# Patient Record
Sex: FEMALE | Race: OTHER | HISPANIC OR LATINO | URBAN - METROPOLITAN AREA
[De-identification: names, ages, dates, MRNs, and addresses within clinical notes are randomized per-mention and may not be internally consistent; named-entity substitution may affect disease eponyms.]

---

## 2020-06-24 ENCOUNTER — INPATIENT (INPATIENT)
Facility: HOSPITAL | Age: 30
LOS: 12 days | Discharge: HOME | End: 2020-07-07
Attending: PSYCHIATRY & NEUROLOGY | Admitting: PSYCHIATRY & NEUROLOGY
Payer: COMMERCIAL

## 2020-06-24 VITALS
TEMPERATURE: 98 F | OXYGEN SATURATION: 100 % | DIASTOLIC BLOOD PRESSURE: 76 MMHG | RESPIRATION RATE: 18 BRPM | HEART RATE: 80 BPM | SYSTOLIC BLOOD PRESSURE: 136 MMHG

## 2020-06-24 DIAGNOSIS — F29 UNSPECIFIED PSYCHOSIS NOT DUE TO A SUBSTANCE OR KNOWN PHYSIOLOGICAL CONDITION: ICD-10-CM

## 2020-06-24 LAB
ALBUMIN SERPL ELPH-MCNC: 4.5 G/DL — SIGNIFICANT CHANGE UP (ref 3.3–5)
ALP SERPL-CCNC: 53 U/L — SIGNIFICANT CHANGE UP (ref 40–120)
ALT FLD-CCNC: 12 U/L — SIGNIFICANT CHANGE UP (ref 4–33)
AMPHET UR-MCNC: NEGATIVE — SIGNIFICANT CHANGE UP
ANION GAP SERPL CALC-SCNC: 16 MMO/L — HIGH (ref 7–14)
APAP SERPL-MCNC: < 15 UG/ML — LOW (ref 15–25)
APPEARANCE UR: CLEAR — SIGNIFICANT CHANGE UP
AST SERPL-CCNC: 13 U/L — SIGNIFICANT CHANGE UP (ref 4–32)
BACTERIA # UR AUTO: HIGH
BARBITURATES UR SCN-MCNC: NEGATIVE — SIGNIFICANT CHANGE UP
BASOPHILS # BLD AUTO: 0.05 K/UL — SIGNIFICANT CHANGE UP (ref 0–0.2)
BASOPHILS NFR BLD AUTO: 0.6 % — SIGNIFICANT CHANGE UP (ref 0–2)
BENZODIAZ UR-MCNC: NEGATIVE — SIGNIFICANT CHANGE UP
BILIRUB SERPL-MCNC: 0.3 MG/DL — SIGNIFICANT CHANGE UP (ref 0.2–1.2)
BILIRUB UR-MCNC: NEGATIVE — SIGNIFICANT CHANGE UP
BLOOD UR QL VISUAL: NEGATIVE — SIGNIFICANT CHANGE UP
BUN SERPL-MCNC: 6 MG/DL — LOW (ref 7–23)
CALCIUM SERPL-MCNC: 9.5 MG/DL — SIGNIFICANT CHANGE UP (ref 8.4–10.5)
CANNABINOIDS UR-MCNC: POSITIVE — SIGNIFICANT CHANGE UP
CHLORIDE SERPL-SCNC: 105 MMOL/L — SIGNIFICANT CHANGE UP (ref 98–107)
CO2 SERPL-SCNC: 19 MMOL/L — LOW (ref 22–31)
COCAINE METAB.OTHER UR-MCNC: NEGATIVE — SIGNIFICANT CHANGE UP
COLOR SPEC: YELLOW — SIGNIFICANT CHANGE UP
CREAT SERPL-MCNC: 0.55 MG/DL — SIGNIFICANT CHANGE UP (ref 0.5–1.3)
EOSINOPHIL # BLD AUTO: 0.06 K/UL — SIGNIFICANT CHANGE UP (ref 0–0.5)
EOSINOPHIL NFR BLD AUTO: 0.8 % — SIGNIFICANT CHANGE UP (ref 0–6)
ETHANOL BLD-MCNC: < 10 MG/DL — SIGNIFICANT CHANGE UP
GLUCOSE SERPL-MCNC: 88 MG/DL — SIGNIFICANT CHANGE UP (ref 70–99)
GLUCOSE UR-MCNC: NEGATIVE — SIGNIFICANT CHANGE UP
HCG SERPL-ACNC: < 5 MIU/ML — SIGNIFICANT CHANGE UP
HCG UR-SCNC: NEGATIVE — SIGNIFICANT CHANGE UP
HCT VFR BLD CALC: 39.3 % — SIGNIFICANT CHANGE UP (ref 34.5–45)
HGB BLD-MCNC: 13 G/DL — SIGNIFICANT CHANGE UP (ref 11.5–15.5)
HYALINE CASTS # UR AUTO: HIGH
IMM GRANULOCYTES NFR BLD AUTO: 0.3 % — SIGNIFICANT CHANGE UP (ref 0–1.5)
KETONES UR-MCNC: HIGH
LEUKOCYTE ESTERASE UR-ACNC: SIGNIFICANT CHANGE UP
LYMPHOCYTES # BLD AUTO: 2.29 K/UL — SIGNIFICANT CHANGE UP (ref 1–3.3)
LYMPHOCYTES # BLD AUTO: 29.2 % — SIGNIFICANT CHANGE UP (ref 13–44)
MCHC RBC-ENTMCNC: 28.6 PG — SIGNIFICANT CHANGE UP (ref 27–34)
MCHC RBC-ENTMCNC: 33.1 % — SIGNIFICANT CHANGE UP (ref 32–36)
MCV RBC AUTO: 86.4 FL — SIGNIFICANT CHANGE UP (ref 80–100)
METHADONE UR-MCNC: NEGATIVE — SIGNIFICANT CHANGE UP
MONOCYTES # BLD AUTO: 0.58 K/UL — SIGNIFICANT CHANGE UP (ref 0–0.9)
MONOCYTES NFR BLD AUTO: 7.4 % — SIGNIFICANT CHANGE UP (ref 2–14)
MUCOUS THREADS # UR AUTO: SIGNIFICANT CHANGE UP
NEUTROPHILS # BLD AUTO: 4.85 K/UL — SIGNIFICANT CHANGE UP (ref 1.8–7.4)
NEUTROPHILS NFR BLD AUTO: 61.7 % — SIGNIFICANT CHANGE UP (ref 43–77)
NITRITE UR-MCNC: NEGATIVE — SIGNIFICANT CHANGE UP
NRBC # FLD: 0 K/UL — SIGNIFICANT CHANGE UP (ref 0–0)
OPIATES UR-MCNC: NEGATIVE — SIGNIFICANT CHANGE UP
OXYCODONE UR-MCNC: NEGATIVE — SIGNIFICANT CHANGE UP
PCP UR-MCNC: NEGATIVE — SIGNIFICANT CHANGE UP
PH UR: 6.5 — SIGNIFICANT CHANGE UP (ref 5–8)
PLATELET # BLD AUTO: 318 K/UL — SIGNIFICANT CHANGE UP (ref 150–400)
PMV BLD: 10 FL — SIGNIFICANT CHANGE UP (ref 7–13)
POTASSIUM SERPL-MCNC: 3.4 MMOL/L — LOW (ref 3.5–5.3)
POTASSIUM SERPL-SCNC: 3.4 MMOL/L — LOW (ref 3.5–5.3)
PROT SERPL-MCNC: 7.4 G/DL — SIGNIFICANT CHANGE UP (ref 6–8.3)
PROT UR-MCNC: 50 — SIGNIFICANT CHANGE UP
RBC # BLD: 4.55 M/UL — SIGNIFICANT CHANGE UP (ref 3.8–5.2)
RBC # FLD: 13.2 % — SIGNIFICANT CHANGE UP (ref 10.3–14.5)
RBC CASTS # UR COMP ASSIST: HIGH (ref 0–?)
SALICYLATES SERPL-MCNC: < 5 MG/DL — LOW (ref 15–30)
SODIUM SERPL-SCNC: 140 MMOL/L — SIGNIFICANT CHANGE UP (ref 135–145)
SP GR SPEC: 1.03 — SIGNIFICANT CHANGE UP (ref 1–1.04)
SQUAMOUS # UR AUTO: SIGNIFICANT CHANGE UP
TSH SERPL-MCNC: 1.78 UIU/ML — SIGNIFICANT CHANGE UP (ref 0.27–4.2)
UROBILINOGEN FLD QL: SIGNIFICANT CHANGE UP
WBC # BLD: 7.85 K/UL — SIGNIFICANT CHANGE UP (ref 3.8–10.5)
WBC # FLD AUTO: 7.85 K/UL — SIGNIFICANT CHANGE UP (ref 3.8–10.5)
WBC UR QL: SIGNIFICANT CHANGE UP (ref 0–?)

## 2020-06-24 PROCEDURE — 99285 EMERGENCY DEPT VISIT HI MDM: CPT

## 2020-06-24 RX ORDER — HALOPERIDOL DECANOATE 100 MG/ML
5 INJECTION INTRAMUSCULAR ONCE
Refills: 0 | Status: COMPLETED | OUTPATIENT
Start: 2020-06-24 | End: 2020-06-24

## 2020-06-24 RX ORDER — DIPHENHYDRAMINE HCL 50 MG
50 CAPSULE ORAL ONCE
Refills: 0 | Status: COMPLETED | OUTPATIENT
Start: 2020-06-24 | End: 2020-06-24

## 2020-06-24 RX ADMIN — HALOPERIDOL DECANOATE 5 MILLIGRAM(S): 100 INJECTION INTRAMUSCULAR at 20:25

## 2020-06-24 RX ADMIN — Medication 50 MILLIGRAM(S): at 20:25

## 2020-06-24 RX ADMIN — Medication 2 MILLIGRAM(S): at 20:25

## 2020-06-24 NOTE — ED ADULT NURSE NOTE - OBJECTIVE STATEMENT
Pt has no known psych diagnosis, h/o drug use, denies any drug use today brought in by boyfriend and boyfriend's mother, for insomnia for the past two weeks accompanied with agitation today and bizarre behavior  Pt denies any drug use denies medical hx needs to be re directed
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
## 2020-06-24 NOTE — ED BEHAVIORAL HEALTH ASSESSMENT NOTE - VIOLENCE RISK FACTORS:
Feeling of being under threat and being unable to control threat/Lack of insight into violence risk/need for treatment/Impulsivity/Irritability

## 2020-06-24 NOTE — ED BEHAVIORAL HEALTH ASSESSMENT NOTE - RISK ASSESSMENT
pt is at acutely elevated risk of harm to others and unable to care for self at this time Low Acute Suicide Risk

## 2020-06-24 NOTE — ED ADULT NURSE NOTE - CHIEF COMPLAINT QUOTE
Pt has no known psych diagnosis, h/o drug use, denies any drug use today brought in by boyfriend and boyfriend's mother, for insomnia for the past two weeks accompanied with agitation today and bizarre behavior. [phone] Leslie (mother-in-law). [phone] Sea (boyfriend).

## 2020-06-24 NOTE — ED ADULT TRIAGE NOTE - CHIEF COMPLAINT QUOTE
Pt has no known psych diagnosis, h/o drug use, denies any drug use today brought in by boyfriend and boyfriend's mother, for insomnia for the past two weeks accompanied with agitation today and bizarre behavior. [phone] Leslie (mother-in-law). [phone] Sea (boyfriend).
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
## 2020-06-24 NOTE — ED BEHAVIORAL HEALTH ASSESSMENT NOTE - SUICIDE RISK FACTORS
Impulsivity/Insomnia/Agitation/Severe Anxiety/Panic/Psychotic disorder current/past/Recent onset of current/past psychiatric diagnosis
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
## 2020-06-24 NOTE — ED BEHAVIORAL HEALTH ASSESSMENT NOTE - DESCRIPTION
in tenuous behavioral control    Vital Signs Last 24 Hrs  T(C): 36.8 (24 Jun 2020 15:46), Max: 36.8 (24 Jun 2020 15:46)  T(F): 98.2 (24 Jun 2020 15:46), Max: 98.2 (24 Jun 2020 15:46)  HR: 80 (24 Jun 2020 15:46) (80 - 80)  BP: 136/76 (24 Jun 2020 15:46) (136/76 - 136/76)  BP(mean): --  RR: 18 (24 Jun 2020 15:46) (18 - 18)  SpO2: 100% (24 Jun 2020 15:46) (100% - 100%) none see HPI

## 2020-06-24 NOTE — ED ADULT NURSE REASSESSMENT NOTE - NS ED NURSE REASSESS COMMENT FT1
Received pt at change of shift, awake, agitated, pacing hallway and yelling. Pt is unable to be verbally deescalated at this time. Medicated as ordered. Boarding in ed overnight until bed available for psych admission. Will continue to monitor for safety.

## 2020-06-25 DIAGNOSIS — F33.9 MAJOR DEPRESSIVE DISORDER, RECURRENT, UNSPECIFIED: ICD-10-CM

## 2020-06-25 LAB
CULTURE RESULTS: SIGNIFICANT CHANGE UP
SARS-COV-2 RNA SPEC QL NAA+PROBE: SIGNIFICANT CHANGE UP
SPECIMEN SOURCE: SIGNIFICANT CHANGE UP

## 2020-06-25 PROCEDURE — 99222 1ST HOSP IP/OBS MODERATE 55: CPT

## 2020-06-25 RX ORDER — DIPHENHYDRAMINE HCL 50 MG
50 CAPSULE ORAL EVERY 6 HOURS
Refills: 0 | Status: DISCONTINUED | OUTPATIENT
Start: 2020-06-25 | End: 2020-07-07

## 2020-06-25 RX ORDER — HALOPERIDOL DECANOATE 100 MG/ML
5 INJECTION INTRAMUSCULAR EVERY 6 HOURS
Refills: 0 | Status: DISCONTINUED | OUTPATIENT
Start: 2020-06-25 | End: 2020-07-07

## 2020-06-25 RX ORDER — POTASSIUM CHLORIDE 20 MEQ
40 PACKET (EA) ORAL ONCE
Refills: 0 | Status: COMPLETED | OUTPATIENT
Start: 2020-06-25 | End: 2020-06-25

## 2020-06-25 RX ORDER — HALOPERIDOL DECANOATE 100 MG/ML
5 INJECTION INTRAMUSCULAR ONCE
Refills: 0 | Status: DISCONTINUED | OUTPATIENT
Start: 2020-06-25 | End: 2020-07-07

## 2020-06-25 RX ORDER — HALOPERIDOL DECANOATE 100 MG/ML
5 INJECTION INTRAMUSCULAR ONCE
Refills: 0 | Status: COMPLETED | OUTPATIENT
Start: 2020-06-25 | End: 2020-06-25

## 2020-06-25 RX ORDER — DIPHENHYDRAMINE HCL 50 MG
50 CAPSULE ORAL ONCE
Refills: 0 | Status: DISCONTINUED | OUTPATIENT
Start: 2020-06-25 | End: 2020-07-07

## 2020-06-25 RX ORDER — RISPERIDONE 4 MG/1
1 TABLET ORAL AT BEDTIME
Refills: 0 | Status: DISCONTINUED | OUTPATIENT
Start: 2020-06-25 | End: 2020-06-30

## 2020-06-25 RX ADMIN — Medication 40 MILLIEQUIVALENT(S): at 17:00

## 2020-06-25 RX ADMIN — Medication 2 MILLIGRAM(S): at 01:13

## 2020-06-25 RX ADMIN — HALOPERIDOL DECANOATE 5 MILLIGRAM(S): 100 INJECTION INTRAMUSCULAR at 01:12

## 2020-06-25 NOTE — ED ADULT NURSE REASSESSMENT NOTE - NS ED NURSE REASSESS COMMENT FT1
Pt awakened, agitated and paranoid towards others with pressured speech. Pt yelling and demanding to leave hospital with poor insight. Pt unable to follow verbal command. Medicated as ordered by Nicki BINGHAM. Will continue to monitor for therapeutic effect and safety.

## 2020-06-25 NOTE — ED ADULT NURSE REASSESSMENT NOTE - NS ED NURSE REASSESS COMMENT FT1
Received report from night RN, pt lying on bed in nad eyes close breathing even & unlabored eval on going.

## 2020-06-26 LAB
ANION GAP SERPL CALC-SCNC: 14 MMO/L — SIGNIFICANT CHANGE UP (ref 7–14)
BUN SERPL-MCNC: 6 MG/DL — LOW (ref 7–23)
CALCIUM SERPL-MCNC: 9.6 MG/DL — SIGNIFICANT CHANGE UP (ref 8.4–10.5)
CHLORIDE SERPL-SCNC: 102 MMOL/L — SIGNIFICANT CHANGE UP (ref 98–107)
CHOLEST SERPL-MCNC: 204 MG/DL — HIGH (ref 120–199)
CO2 SERPL-SCNC: 20 MMOL/L — LOW (ref 22–31)
CREAT SERPL-MCNC: 0.49 MG/DL — LOW (ref 0.5–1.3)
GLUCOSE SERPL-MCNC: 137 MG/DL — HIGH (ref 70–99)
HBA1C BLD-MCNC: 4.8 % — SIGNIFICANT CHANGE UP (ref 4–5.6)
HDLC SERPL-MCNC: 63 MG/DL — SIGNIFICANT CHANGE UP (ref 45–65)
LIPID PNL WITH DIRECT LDL SERPL: 133 MG/DL — SIGNIFICANT CHANGE UP
POTASSIUM SERPL-MCNC: 4 MMOL/L — SIGNIFICANT CHANGE UP (ref 3.5–5.3)
POTASSIUM SERPL-SCNC: 4 MMOL/L — SIGNIFICANT CHANGE UP (ref 3.5–5.3)
SODIUM SERPL-SCNC: 136 MMOL/L — SIGNIFICANT CHANGE UP (ref 135–145)
TRIGL SERPL-MCNC: 77 MG/DL — SIGNIFICANT CHANGE UP (ref 10–149)

## 2020-06-26 PROCEDURE — 99231 SBSQ HOSP IP/OBS SF/LOW 25: CPT | Mod: GC

## 2020-06-27 PROCEDURE — 99232 SBSQ HOSP IP/OBS MODERATE 35: CPT

## 2020-06-28 PROCEDURE — 99232 SBSQ HOSP IP/OBS MODERATE 35: CPT

## 2020-06-28 RX ORDER — MAGNESIUM HYDROXIDE 400 MG/1
30 TABLET, CHEWABLE ORAL DAILY
Refills: 0 | Status: DISCONTINUED | OUTPATIENT
Start: 2020-06-28 | End: 2020-07-07

## 2020-06-28 RX ADMIN — MAGNESIUM HYDROXIDE 30 MILLILITER(S): 400 TABLET, CHEWABLE ORAL at 11:30

## 2020-06-29 PROCEDURE — 99232 SBSQ HOSP IP/OBS MODERATE 35: CPT | Mod: GC

## 2020-06-29 RX ADMIN — MAGNESIUM HYDROXIDE 30 MILLILITER(S): 400 TABLET, CHEWABLE ORAL at 16:17

## 2020-06-30 PROCEDURE — 99231 SBSQ HOSP IP/OBS SF/LOW 25: CPT | Mod: GC

## 2020-06-30 RX ORDER — LITHIUM CARBONATE 300 MG/1
450 TABLET, EXTENDED RELEASE ORAL AT BEDTIME
Refills: 0 | Status: DISCONTINUED | OUTPATIENT
Start: 2020-06-30 | End: 2020-06-30

## 2020-06-30 RX ORDER — LITHIUM CARBONATE 300 MG/1
450 TABLET, EXTENDED RELEASE ORAL AT BEDTIME
Refills: 0 | Status: DISCONTINUED | OUTPATIENT
Start: 2020-06-30 | End: 2020-07-01

## 2020-06-30 RX ADMIN — LITHIUM CARBONATE 450 MILLIGRAM(S): 300 TABLET, EXTENDED RELEASE ORAL at 22:33

## 2020-07-01 PROCEDURE — 99231 SBSQ HOSP IP/OBS SF/LOW 25: CPT | Mod: GC

## 2020-07-01 RX ORDER — LITHIUM CARBONATE 300 MG/1
900 TABLET, EXTENDED RELEASE ORAL AT BEDTIME
Refills: 0 | Status: DISCONTINUED | OUTPATIENT
Start: 2020-07-01 | End: 2020-07-07

## 2020-07-01 RX ADMIN — LITHIUM CARBONATE 900 MILLIGRAM(S): 300 TABLET, EXTENDED RELEASE ORAL at 20:55

## 2020-07-02 PROCEDURE — 99231 SBSQ HOSP IP/OBS SF/LOW 25: CPT | Mod: GC

## 2020-07-02 RX ORDER — ACETAMINOPHEN 500 MG
650 TABLET ORAL EVERY 6 HOURS
Refills: 0 | Status: DISCONTINUED | OUTPATIENT
Start: 2020-07-02 | End: 2020-07-07

## 2020-07-02 RX ADMIN — LITHIUM CARBONATE 900 MILLIGRAM(S): 300 TABLET, EXTENDED RELEASE ORAL at 21:15

## 2020-07-03 RX ADMIN — LITHIUM CARBONATE 900 MILLIGRAM(S): 300 TABLET, EXTENDED RELEASE ORAL at 20:45

## 2020-07-04 RX ADMIN — LITHIUM CARBONATE 900 MILLIGRAM(S): 300 TABLET, EXTENDED RELEASE ORAL at 20:40

## 2020-07-05 PROCEDURE — 99232 SBSQ HOSP IP/OBS MODERATE 35: CPT

## 2020-07-05 RX ADMIN — LITHIUM CARBONATE 900 MILLIGRAM(S): 300 TABLET, EXTENDED RELEASE ORAL at 20:21

## 2020-07-06 LAB — LITHIUM SERPL-MCNC: 0.59 MMOL/L — LOW (ref 0.6–1.2)

## 2020-07-06 PROCEDURE — 99231 SBSQ HOSP IP/OBS SF/LOW 25: CPT | Mod: GC

## 2020-07-06 RX ORDER — LITHIUM CARBONATE 300 MG/1
2 TABLET, EXTENDED RELEASE ORAL
Qty: 28 | Refills: 0
Start: 2020-07-06 | End: 2020-07-19

## 2020-07-06 RX ADMIN — LITHIUM CARBONATE 900 MILLIGRAM(S): 300 TABLET, EXTENDED RELEASE ORAL at 21:30

## 2020-07-07 VITALS — TEMPERATURE: 99 F

## 2020-07-07 LAB
ALBUMIN SERPL ELPH-MCNC: 4.4 G/DL — SIGNIFICANT CHANGE UP (ref 3.3–5)
ALP SERPL-CCNC: 57 U/L — SIGNIFICANT CHANGE UP (ref 40–120)
ALT FLD-CCNC: 13 U/L — SIGNIFICANT CHANGE UP (ref 4–33)
ANION GAP SERPL CALC-SCNC: 11 MMO/L — SIGNIFICANT CHANGE UP (ref 7–14)
AST SERPL-CCNC: 13 U/L — SIGNIFICANT CHANGE UP (ref 4–32)
BASOPHILS # BLD AUTO: 0.06 K/UL — SIGNIFICANT CHANGE UP (ref 0–0.2)
BASOPHILS NFR BLD AUTO: 1 % — SIGNIFICANT CHANGE UP (ref 0–2)
BILIRUB SERPL-MCNC: 0.3 MG/DL — SIGNIFICANT CHANGE UP (ref 0.2–1.2)
BUN SERPL-MCNC: 6 MG/DL — LOW (ref 7–23)
CALCIUM SERPL-MCNC: 9.7 MG/DL — SIGNIFICANT CHANGE UP (ref 8.4–10.5)
CHLORIDE SERPL-SCNC: 106 MMOL/L — SIGNIFICANT CHANGE UP (ref 98–107)
CO2 SERPL-SCNC: 23 MMOL/L — SIGNIFICANT CHANGE UP (ref 22–31)
CREAT SERPL-MCNC: 0.55 MG/DL — SIGNIFICANT CHANGE UP (ref 0.5–1.3)
EOSINOPHIL # BLD AUTO: 0.22 K/UL — SIGNIFICANT CHANGE UP (ref 0–0.5)
EOSINOPHIL NFR BLD AUTO: 3.7 % — SIGNIFICANT CHANGE UP (ref 0–6)
GLUCOSE SERPL-MCNC: 81 MG/DL — SIGNIFICANT CHANGE UP (ref 70–99)
HCT VFR BLD CALC: 43.6 % — SIGNIFICANT CHANGE UP (ref 34.5–45)
HGB BLD-MCNC: 13.8 G/DL — SIGNIFICANT CHANGE UP (ref 11.5–15.5)
IMM GRANULOCYTES NFR BLD AUTO: 0.2 % — SIGNIFICANT CHANGE UP (ref 0–1.5)
LITHIUM SERPL-MCNC: 0.54 MMOL/L — LOW (ref 0.6–1.2)
LYMPHOCYTES # BLD AUTO: 1.54 K/UL — SIGNIFICANT CHANGE UP (ref 1–3.3)
LYMPHOCYTES # BLD AUTO: 26.2 % — SIGNIFICANT CHANGE UP (ref 13–44)
MCHC RBC-ENTMCNC: 27.8 PG — SIGNIFICANT CHANGE UP (ref 27–34)
MCHC RBC-ENTMCNC: 31.7 % — LOW (ref 32–36)
MCV RBC AUTO: 87.9 FL — SIGNIFICANT CHANGE UP (ref 80–100)
MONOCYTES # BLD AUTO: 0.37 K/UL — SIGNIFICANT CHANGE UP (ref 0–0.9)
MONOCYTES NFR BLD AUTO: 6.3 % — SIGNIFICANT CHANGE UP (ref 2–14)
NEUTROPHILS # BLD AUTO: 3.67 K/UL — SIGNIFICANT CHANGE UP (ref 1.8–7.4)
NEUTROPHILS NFR BLD AUTO: 62.6 % — SIGNIFICANT CHANGE UP (ref 43–77)
NRBC # FLD: 0 K/UL — SIGNIFICANT CHANGE UP (ref 0–0)
PLATELET # BLD AUTO: 354 K/UL — SIGNIFICANT CHANGE UP (ref 150–400)
PMV BLD: 10 FL — SIGNIFICANT CHANGE UP (ref 7–13)
POTASSIUM SERPL-MCNC: 3.7 MMOL/L — SIGNIFICANT CHANGE UP (ref 3.5–5.3)
POTASSIUM SERPL-SCNC: 3.7 MMOL/L — SIGNIFICANT CHANGE UP (ref 3.5–5.3)
PROT SERPL-MCNC: 7.7 G/DL — SIGNIFICANT CHANGE UP (ref 6–8.3)
RBC # BLD: 4.96 M/UL — SIGNIFICANT CHANGE UP (ref 3.8–5.2)
RBC # FLD: 12.7 % — SIGNIFICANT CHANGE UP (ref 10.3–14.5)
SODIUM SERPL-SCNC: 140 MMOL/L — SIGNIFICANT CHANGE UP (ref 135–145)
WBC # BLD: 5.87 K/UL — SIGNIFICANT CHANGE UP (ref 3.8–10.5)
WBC # FLD AUTO: 5.87 K/UL — SIGNIFICANT CHANGE UP (ref 3.8–10.5)

## 2020-07-07 PROCEDURE — 99239 HOSP IP/OBS DSCHRG MGMT >30: CPT | Mod: GC

## 2020-07-07 RX ADMIN — Medication 650 MILLIGRAM(S): at 08:54

## 2020-07-13 PROBLEM — F31.9 BIPOLAR DISORDER, UNSPECIFIED: Chronic | Status: ACTIVE | Noted: 2020-06-24

## 2020-07-13 PROBLEM — T76.22XA CHILD SEXUAL ABUSE, SUSPECTED, INITIAL ENCOUNTER: Chronic | Status: ACTIVE | Noted: 2020-06-24

## 2020-07-25 ENCOUNTER — INPATIENT (INPATIENT)
Facility: HOSPITAL | Age: 30
LOS: 4 days | Discharge: HOME | End: 2020-07-30
Attending: PSYCHIATRY & NEUROLOGY | Admitting: PSYCHIATRY & NEUROLOGY
Payer: COMMERCIAL

## 2020-07-25 VITALS
HEART RATE: 81 BPM | OXYGEN SATURATION: 99 % | TEMPERATURE: 98 F | DIASTOLIC BLOOD PRESSURE: 82 MMHG | SYSTOLIC BLOOD PRESSURE: 115 MMHG | RESPIRATION RATE: 16 BRPM

## 2020-07-25 DIAGNOSIS — F31.9 BIPOLAR DISORDER, UNSPECIFIED: ICD-10-CM

## 2020-07-25 LAB
ALBUMIN SERPL ELPH-MCNC: 4.7 G/DL — SIGNIFICANT CHANGE UP (ref 3.3–5)
ALP SERPL-CCNC: 54 U/L — SIGNIFICANT CHANGE UP (ref 40–120)
ALT FLD-CCNC: 18 U/L — SIGNIFICANT CHANGE UP (ref 4–33)
AMPHET UR-MCNC: NEGATIVE — SIGNIFICANT CHANGE UP
ANION GAP SERPL CALC-SCNC: 15 MMO/L — HIGH (ref 7–14)
APAP SERPL-MCNC: < 15 UG/ML — LOW (ref 15–25)
APPEARANCE UR: SIGNIFICANT CHANGE UP
AST SERPL-CCNC: 15 U/L — SIGNIFICANT CHANGE UP (ref 4–32)
BACTERIA # UR AUTO: HIGH
BARBITURATES UR SCN-MCNC: NEGATIVE — SIGNIFICANT CHANGE UP
BASOPHILS # BLD AUTO: 0.07 K/UL — SIGNIFICANT CHANGE UP (ref 0–0.2)
BASOPHILS NFR BLD AUTO: 0.9 % — SIGNIFICANT CHANGE UP (ref 0–2)
BENZODIAZ UR-MCNC: NEGATIVE — SIGNIFICANT CHANGE UP
BILIRUB SERPL-MCNC: < 0.2 MG/DL — LOW (ref 0.2–1.2)
BILIRUB UR-MCNC: NEGATIVE — SIGNIFICANT CHANGE UP
BLOOD UR QL VISUAL: NEGATIVE — SIGNIFICANT CHANGE UP
BUN SERPL-MCNC: 8 MG/DL — SIGNIFICANT CHANGE UP (ref 7–23)
CALCIUM SERPL-MCNC: 9.6 MG/DL — SIGNIFICANT CHANGE UP (ref 8.4–10.5)
CANNABINOIDS UR-MCNC: POSITIVE — SIGNIFICANT CHANGE UP
CHLORIDE SERPL-SCNC: 102 MMOL/L — SIGNIFICANT CHANGE UP (ref 98–107)
CO2 SERPL-SCNC: 21 MMOL/L — LOW (ref 22–31)
COCAINE METAB.OTHER UR-MCNC: NEGATIVE — SIGNIFICANT CHANGE UP
COLOR SPEC: YELLOW — SIGNIFICANT CHANGE UP
CREAT SERPL-MCNC: 0.73 MG/DL — SIGNIFICANT CHANGE UP (ref 0.5–1.3)
EOSINOPHIL # BLD AUTO: 0.24 K/UL — SIGNIFICANT CHANGE UP (ref 0–0.5)
EOSINOPHIL NFR BLD AUTO: 2.9 % — SIGNIFICANT CHANGE UP (ref 0–6)
ETHANOL BLD-MCNC: < 10 MG/DL — SIGNIFICANT CHANGE UP
GLUCOSE SERPL-MCNC: 99 MG/DL — SIGNIFICANT CHANGE UP (ref 70–99)
GLUCOSE UR-MCNC: NEGATIVE — SIGNIFICANT CHANGE UP
HCG SERPL-ACNC: < 5 MIU/ML — SIGNIFICANT CHANGE UP
HCT VFR BLD CALC: 40.4 % — SIGNIFICANT CHANGE UP (ref 34.5–45)
HGB BLD-MCNC: 12.6 G/DL — SIGNIFICANT CHANGE UP (ref 11.5–15.5)
IMM GRANULOCYTES NFR BLD AUTO: 0.2 % — SIGNIFICANT CHANGE UP (ref 0–1.5)
KETONES UR-MCNC: NEGATIVE — SIGNIFICANT CHANGE UP
LEUKOCYTE ESTERASE UR-ACNC: SIGNIFICANT CHANGE UP
LITHIUM SERPL-MCNC: 0.37 MMOL/L — LOW (ref 0.6–1.2)
LYMPHOCYTES # BLD AUTO: 2.64 K/UL — SIGNIFICANT CHANGE UP (ref 1–3.3)
LYMPHOCYTES # BLD AUTO: 32.4 % — SIGNIFICANT CHANGE UP (ref 13–44)
MCHC RBC-ENTMCNC: 27.3 PG — SIGNIFICANT CHANGE UP (ref 27–34)
MCHC RBC-ENTMCNC: 31.2 % — LOW (ref 32–36)
MCV RBC AUTO: 87.6 FL — SIGNIFICANT CHANGE UP (ref 80–100)
METHADONE UR-MCNC: NEGATIVE — SIGNIFICANT CHANGE UP
MONOCYTES # BLD AUTO: 0.6 K/UL — SIGNIFICANT CHANGE UP (ref 0–0.9)
MONOCYTES NFR BLD AUTO: 7.4 % — SIGNIFICANT CHANGE UP (ref 2–14)
MUCOUS THREADS # UR AUTO: SIGNIFICANT CHANGE UP
NEUTROPHILS # BLD AUTO: 4.59 K/UL — SIGNIFICANT CHANGE UP (ref 1.8–7.4)
NEUTROPHILS NFR BLD AUTO: 56.2 % — SIGNIFICANT CHANGE UP (ref 43–77)
NITRITE UR-MCNC: NEGATIVE — SIGNIFICANT CHANGE UP
NRBC # FLD: 0 K/UL — SIGNIFICANT CHANGE UP (ref 0–0)
OPIATES UR-MCNC: NEGATIVE — SIGNIFICANT CHANGE UP
OXYCODONE UR-MCNC: NEGATIVE — SIGNIFICANT CHANGE UP
PCP UR-MCNC: NEGATIVE — SIGNIFICANT CHANGE UP
PH UR: 6.5 — SIGNIFICANT CHANGE UP (ref 5–8)
PLATELET # BLD AUTO: 369 K/UL — SIGNIFICANT CHANGE UP (ref 150–400)
PMV BLD: 9.5 FL — SIGNIFICANT CHANGE UP (ref 7–13)
POTASSIUM SERPL-MCNC: 3.6 MMOL/L — SIGNIFICANT CHANGE UP (ref 3.5–5.3)
POTASSIUM SERPL-SCNC: 3.6 MMOL/L — SIGNIFICANT CHANGE UP (ref 3.5–5.3)
PROT SERPL-MCNC: 7.4 G/DL — SIGNIFICANT CHANGE UP (ref 6–8.3)
PROT UR-MCNC: 10 — SIGNIFICANT CHANGE UP
RBC # BLD: 4.61 M/UL — SIGNIFICANT CHANGE UP (ref 3.8–5.2)
RBC # FLD: 12.9 % — SIGNIFICANT CHANGE UP (ref 10.3–14.5)
RBC CASTS # UR COMP ASSIST: SIGNIFICANT CHANGE UP (ref 0–?)
SALICYLATES SERPL-MCNC: < 5 MG/DL — LOW (ref 15–30)
SARS-COV-2 RNA SPEC QL NAA+PROBE: SIGNIFICANT CHANGE UP
SODIUM SERPL-SCNC: 138 MMOL/L — SIGNIFICANT CHANGE UP (ref 135–145)
SP GR SPEC: 1.02 — SIGNIFICANT CHANGE UP (ref 1–1.04)
SQUAMOUS # UR AUTO: SIGNIFICANT CHANGE UP
TSH SERPL-MCNC: 5.34 UIU/ML — HIGH (ref 0.27–4.2)
UROBILINOGEN FLD QL: NORMAL — SIGNIFICANT CHANGE UP
WBC # BLD: 8.16 K/UL — SIGNIFICANT CHANGE UP (ref 3.8–10.5)
WBC # FLD AUTO: 8.16 K/UL — SIGNIFICANT CHANGE UP (ref 3.8–10.5)
WBC UR QL: HIGH (ref 0–?)

## 2020-07-25 PROCEDURE — 99285 EMERGENCY DEPT VISIT HI MDM: CPT

## 2020-07-25 NOTE — ED BEHAVIORAL HEALTH ASSESSMENT NOTE - VIOLENCE RISK FACTORS:
Affective dysregulation/Noncompliance with treatment/Lack of insight into violence risk/need for treatment/Irritability

## 2020-07-25 NOTE — ED BEHAVIORAL HEALTH ASSESSMENT NOTE - PSYCHIATRIC ISSUES AND PLAN (INCLUDE STANDING AND PRN MEDICATION)
PRN agitation meds (haldol 5mg PRN q6hr, ativan 2mg PRN q6hr), rach - lithium 300mg BID (level 0.37 currently), risperidone 0.5mg qHS, sleep - ativan 0.5mg PRN qHS
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
## 2020-07-25 NOTE — ED BEHAVIORAL HEALTH ASSESSMENT NOTE - HPI (INCLUDE ILLNESS QUALITY, SEVERITY, DURATION, TIMING, CONTEXT, MODIFYING FACTORS, ASSOCIATED SIGNS AND SYMPTOMS)
29 year old  woman, no children, unemployed, living with boyfriend, recently diagnosed with Bipolar Disorder, 2 prior psychiatric hospitalizations (most recent at [facility], discharged on 7/7/20; first hospitalization 10 years ago for unknown reasons), not in outpatient treatment, no history of self- injurious behaviour/ suicidal behaviour, no known legal/violence history, no significant past medical history, hx of cannabis use, BIBEMS activated by boyfriend for paranoia and agitation.    On exam patient is guarded and unable to provide a linear, coherent series of events. She reports that since discharge she has been "fine" and taking her medications (lithium) but states that she does not administer the medication herself and believes that her boyfriend may have been giving them to her. Per her presentation in the hospital she reports that her boyfriend was worried because she stepped on glass after accidentally breaking her glasses. On ROS she denied all psychiatric symptoms including changes in appetite, sleep, energy level; denied racing thoughts and increased goal directed activities. She also denies SI/HI/AH/VH. On further questioning the patient with many nonsequitur answers. She incoherently mentions several people's names and when asked who they are became guarded and suspicious. Thought process became more disorganized throughout the interview.    Per collateral for patient's boyfriend (Sea Boudreaux) after discharge from [facility] on 7/7/20 the patient decided that she did not need to take the prescribed lithium because she felt fine and she did not follow-up with outpatient care. As she appeared okay, he went along with the idea. However roughly 2 weeks after discharge, the patient began acting similar to what prompted her admission on 6/25/20. She became increasingly irritable and aggressive; had a decrease in appetite and has "barely" been sleeping. He also states that she has been "talking non stop". Today the patient was at home speaking loudly and going throughout the home breaking things. He became concerned that she would harm herself so he called EMS. Of note, he also stated that he called Dr. Nam 2 days ago who recommended that they restart Lithium (which he administered for the last 2 days.) Currently he feels unable to manage the patient at home.

## 2020-07-25 NOTE — ED ADULT NURSE NOTE - OBJECTIVE STATEMENT
PT received into  29 year old female with [place] Bipolar disorder brought in by EMS for manic, bizarre behavior. As per EMS PT non compliant with lithium. PT arrives manic, paranoid, disorganized. PT denies SI, HI, AH, VH, ETOH or drug use. NP evaluated, Labs drawn, COVID swab sent. Awaiting Psych eval at this time. Will continue to monitor for safety and comfort.

## 2020-07-25 NOTE — ED ADULT TRIAGE NOTE - CHIEF COMPLAINT QUOTE
Pt arrives to ED via EMS with [employer].  Pt not under arrest.  Pt is non compliant with her medications and today patient started acting bizarrely.  Pt recently diagnosed with manic Bipolar.  Pt discharged from [facility] on 7/7/20.  Pt was started on Lithium 450mg but has not taken it after [facility] admission.  Pt boyfriend phone is [phone].  Pt denies S/I, H/I or audio/visual hallucinations.  Pt talking about eyeglasses she threw away and talking about the universe.  Pt having difficulty siting still.  [facility] called and accept pt.
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
## 2020-07-25 NOTE — ED PROVIDER NOTE - OBJECTIVE STATEMENT
28 y/o F   hx  Bipolar D/O  BIBA   secondary to increase rach. Patient appears paranoid  , presents disrobing in the hallway.   Denies SI/HI/AH/VH.  Denies falling, punching or kicking any objects.  Admits to medication non compliance.  Denies pain, SOB, dizziness  fever, chills  chest/ abdominal discomfort. Denies recent use of alcohol or illicit drugs. No evidence of physical injuries, broken  skin or deformities.

## 2020-07-25 NOTE — ED ADULT NURSE NOTE - CHIEF COMPLAINT QUOTE
Pt arrives to ED via EMS with [employer].  Pt not under arrest.  Pt is non compliant with her medications and today patient started acting bizarrely.  Pt recently diagnosed with manic Bipolar.  Pt discharged from [facility] on 7/7/20.  Pt was started on Lithium 450mg but has not taken it after [facility] admission.  Pt boyfriend phone is [phone].  Pt denies S/I, H/I or audio/visual hallucinations.  Pt talking about eyeglasses she threw away and talking about the universe.  Pt having difficulty siting still.  [facility] called and accept pt.

## 2020-07-25 NOTE — ED BEHAVIORAL HEALTH ASSESSMENT NOTE - RISK ASSESSMENT
Patient at elevated risk for suicidality given sex, affective illness, and poor compliance with treatment; current risk level is mitigated by stable relationship, no prior suicide attempt/ suicidal behaviour and she denies current suicidality. Low Acute Suicide Risk

## 2020-07-25 NOTE — ED BEHAVIORAL HEALTH ASSESSMENT NOTE - DESCRIPTION
non known · BP Systolic	115 mm Hg  · BP Diastolic	82 mm Hg  · Heart Rate	81 /min  · Respiration Rate (breaths/min)	16 /min  · Temp (F)	98.3 Degrees F  · Temp (C)	36.8 Degrees C  · Temp site	oral  · SpO2 (%)	99 %  · O2 Delivery/Oxygen Delivery Method	room air Received haldol 5/ativan 2 -- disorganized, disrobing in the hallway, throwing things    · BP Systolic	115 mm Hg  · BP Diastolic	82 mm Hg  · Heart Rate	81 /min  · Respiration Rate (breaths/min)	16 /min  · Temp (F)	98.3 Degrees F  · Temp (C)	36.8 Degrees C  · Temp site	oral  · SpO2 (%)	99 %  · O2 Delivery/Oxygen Delivery Method	room air see HPI

## 2020-07-25 NOTE — ED BEHAVIORAL HEALTH ASSESSMENT NOTE - OTHER PAST PSYCHIATRIC HISTORY (INCLUDE DETAILS REGARDING ONSET, COURSE OF ILLNESS, INPATIENT/OUTPATIENT TREATMENT)
recently diagnosed with Bipolar Disorder at [facility] after a recent admission; unclear prior psychiatric history

## 2020-07-25 NOTE — ED PROVIDER NOTE - CLINICAL SUMMARY MEDICAL DECISION MAKING FREE TEXT BOX
Medical evaluation performed. There is no clinical evidence of intoxication or any acute medical problem requiring immediate intervention. Patient is awaiting psychiatric consultation. Final disposition will be determined by psychiatrist. Labs, Urine Tox/UA, EKG, HCG     Medical evaluation performed. There is no clinical evidence of intoxication or any acute medical problem requiring immediate intervention. Patient is awaiting psychiatric consultation. Final disposition will be determined by psychiatrist.

## 2020-07-26 DIAGNOSIS — F31.10 BIPOLAR DISORDER, CURRENT EPISODE MANIC WITHOUT PSYCHOTIC FEATURES, UNSPECIFIED: ICD-10-CM

## 2020-07-26 LAB
SARS-COV-2 IGG SERPL QL IA: NEGATIVE — SIGNIFICANT CHANGE UP
SARS-COV-2 IGM SERPL IA-ACNC: 0.11 INDEX — SIGNIFICANT CHANGE UP
TSH SERPL-MCNC: 2.76 UIU/ML — SIGNIFICANT CHANGE UP (ref 0.27–4.2)

## 2020-07-26 PROCEDURE — 99222 1ST HOSP IP/OBS MODERATE 55: CPT

## 2020-07-26 RX ORDER — DIPHENHYDRAMINE HCL 50 MG
50 CAPSULE ORAL EVERY 6 HOURS
Refills: 0 | Status: DISCONTINUED | OUTPATIENT
Start: 2020-07-26 | End: 2020-07-30

## 2020-07-26 RX ORDER — LITHIUM CARBONATE 300 MG/1
300 TABLET, EXTENDED RELEASE ORAL
Refills: 0 | Status: DISCONTINUED | OUTPATIENT
Start: 2020-07-26 | End: 2020-07-27

## 2020-07-26 RX ORDER — HALOPERIDOL DECANOATE 100 MG/ML
5 INJECTION INTRAMUSCULAR ONCE
Refills: 0 | Status: COMPLETED | OUTPATIENT
Start: 2020-07-26 | End: 2020-07-26

## 2020-07-26 RX ORDER — RISPERIDONE 4 MG/1
0.5 TABLET ORAL AT BEDTIME
Refills: 0 | Status: DISCONTINUED | OUTPATIENT
Start: 2020-07-26 | End: 2020-07-30

## 2020-07-26 RX ORDER — ACETAMINOPHEN 500 MG
325 TABLET ORAL EVERY 4 HOURS
Refills: 0 | Status: DISCONTINUED | OUTPATIENT
Start: 2020-07-26 | End: 2020-07-30

## 2020-07-26 RX ORDER — HALOPERIDOL DECANOATE 100 MG/ML
5 INJECTION INTRAMUSCULAR EVERY 6 HOURS
Refills: 0 | Status: DISCONTINUED | OUTPATIENT
Start: 2020-07-26 | End: 2020-07-30

## 2020-07-26 RX ADMIN — RISPERIDONE 0.5 MILLIGRAM(S): 4 TABLET ORAL at 20:20

## 2020-07-26 RX ADMIN — LITHIUM CARBONATE 300 MILLIGRAM(S): 300 TABLET, EXTENDED RELEASE ORAL at 08:59

## 2020-07-26 RX ADMIN — LITHIUM CARBONATE 300 MILLIGRAM(S): 300 TABLET, EXTENDED RELEASE ORAL at 20:20

## 2020-07-26 RX ADMIN — HALOPERIDOL DECANOATE 5 MILLIGRAM(S): 100 INJECTION INTRAMUSCULAR at 00:18

## 2020-07-26 RX ADMIN — Medication 2 MILLIGRAM(S): at 00:18

## 2020-07-27 PROCEDURE — 99232 SBSQ HOSP IP/OBS MODERATE 35: CPT

## 2020-07-27 RX ORDER — LITHIUM CARBONATE 300 MG/1
900 TABLET, EXTENDED RELEASE ORAL AT BEDTIME
Refills: 0 | Status: DISCONTINUED | OUTPATIENT
Start: 2020-07-28 | End: 2020-07-30

## 2020-07-27 RX ORDER — LITHIUM CARBONATE 300 MG/1
450 TABLET, EXTENDED RELEASE ORAL AT BEDTIME
Refills: 0 | Status: COMPLETED | OUTPATIENT
Start: 2020-07-27 | End: 2020-07-27

## 2020-07-27 RX ORDER — LITHIUM CARBONATE 300 MG/1
450 TABLET, EXTENDED RELEASE ORAL
Refills: 0 | Status: DISCONTINUED | OUTPATIENT
Start: 2020-07-27 | End: 2020-07-27

## 2020-07-27 RX ADMIN — Medication 0.5 MILLIGRAM(S): at 20:22

## 2020-07-27 RX ADMIN — RISPERIDONE 0.5 MILLIGRAM(S): 4 TABLET ORAL at 20:21

## 2020-07-27 RX ADMIN — LITHIUM CARBONATE 450 MILLIGRAM(S): 300 TABLET, EXTENDED RELEASE ORAL at 20:21

## 2020-07-27 RX ADMIN — LITHIUM CARBONATE 300 MILLIGRAM(S): 300 TABLET, EXTENDED RELEASE ORAL at 09:39

## 2020-07-28 PROCEDURE — 99232 SBSQ HOSP IP/OBS MODERATE 35: CPT

## 2020-07-28 RX ADMIN — Medication 325 MILLIGRAM(S): at 04:00

## 2020-07-28 RX ADMIN — RISPERIDONE 0.5 MILLIGRAM(S): 4 TABLET ORAL at 20:04

## 2020-07-28 RX ADMIN — LITHIUM CARBONATE 900 MILLIGRAM(S): 300 TABLET, EXTENDED RELEASE ORAL at 20:04

## 2020-07-28 RX ADMIN — Medication 0.5 MILLIGRAM(S): at 20:04

## 2020-07-28 RX ADMIN — Medication 325 MILLIGRAM(S): at 05:50

## 2020-07-29 PROCEDURE — 99232 SBSQ HOSP IP/OBS MODERATE 35: CPT

## 2020-07-29 RX ORDER — LITHIUM CARBONATE 300 MG/1
2 TABLET, EXTENDED RELEASE ORAL
Qty: 60 | Refills: 0
Start: 2020-07-29 | End: 2020-08-27

## 2020-07-29 RX ORDER — RISPERIDONE 4 MG/1
1 TABLET ORAL
Qty: 30 | Refills: 0
Start: 2020-07-29 | End: 2020-08-27

## 2020-07-29 RX ADMIN — Medication 30 MILLILITER(S): at 12:09

## 2020-07-29 RX ADMIN — LITHIUM CARBONATE 900 MILLIGRAM(S): 300 TABLET, EXTENDED RELEASE ORAL at 20:24

## 2020-07-29 RX ADMIN — RISPERIDONE 0.5 MILLIGRAM(S): 4 TABLET ORAL at 20:24

## 2020-07-29 RX ADMIN — Medication 0.5 MILLIGRAM(S): at 20:25

## 2020-07-30 VITALS — TEMPERATURE: 98 F

## 2020-07-30 PROCEDURE — 99239 HOSP IP/OBS DSCHRG MGMT >30: CPT

## 2021-08-18 NOTE — ED BEHAVIORAL HEALTH NOTE - BEHAVIORAL HEALTH NOTE
Patient insurance information for an [employer] policy #[id number] previously provided by patient’s boyfriend to susannah CHARLES. ARACELI spoke with Registration who ran insurance policy and ARACELI was informed that policy is inactive. ARACELI contacted patient’s boyfriend, Sea Boudreaux [phone], who states that patient’s insurance was tied to her work but he thinks that it should still be active. He will look to see if there is a new policy number or card and contact ARACELI. Procedure explained.  Questions / concerns addressed.  Consent obtained.

## 2022-04-12 NOTE — ED BEHAVIORAL HEALTH ASSESSMENT NOTE - NS ED BHA HOMICIDALITY PRESENT AGGRESSION PROPERTY LIFETIME
Quality 130: Documentation Of Current Medications In The Medical Record: Current Medications Documented None known Quality 226: Preventive Care And Screening: Tobacco Use: Screening And Cessation Intervention: Patient screened for tobacco use and is an ex/non-smoker Quality 431: Preventive Care And Screening: Unhealthy Alcohol Use - Screening: Patient not identified as an unhealthy alcohol user when screened for unhealthy alcohol use using a systematic screening method Detail Level: Detailed

## 2022-08-17 NOTE — ED PROVIDER NOTE - PSYCHIATRIC MOOD
What Is The Reason For Today's Visit?: History of Non-Melanoma Skin Cancer How Many Skin Cancers Have You Had?: more than one When Was Your Last Cancer Diagnosed?: 2021 ANXIOUS

## 2023-12-05 NOTE — ED BEHAVIORAL HEALTH ASSESSMENT NOTE - ADULT OR CHILD PROTECTIVE SERVICES INVOLVEMENT
Detail Level: Zone Render In Strict Bullet Format?: No Plan: Patient was recommended over the counter, ammonium lactate 12% cream for chronic dry skin. No

## 2024-08-03 ENCOUNTER — INPATIENT (INPATIENT)
Facility: HOSPITAL | Age: 34
LOS: 5 days | Discharge: HOME | End: 2024-08-09
Attending: PSYCHIATRY & NEUROLOGY | Admitting: PSYCHIATRY & NEUROLOGY
Payer: COMMERCIAL

## 2024-08-03 VITALS
RESPIRATION RATE: 19 BRPM | OXYGEN SATURATION: 99 % | TEMPERATURE: 98 F | DIASTOLIC BLOOD PRESSURE: 90 MMHG | SYSTOLIC BLOOD PRESSURE: 133 MMHG | HEART RATE: 94 BPM

## 2024-08-03 DIAGNOSIS — F31.13 BIPOLAR DISORDER, CURRENT EPISODE MANIC WITHOUT PSYCHOTIC FEATURES, SEVERE: ICD-10-CM

## 2024-08-03 PROCEDURE — 99285 EMERGENCY DEPT VISIT HI MDM: CPT

## 2024-08-03 NOTE — ED BEHAVIORAL HEALTH ASSESSMENT NOTE - SUMMARY
This is a 33 year old woman, , with a PPH of bipolar disorder with at least 2 prior hospitalizations (most recently at [facility] in 2020), history of medication noncompliance, denies outpatient treatment at this time, unknown PMH, brought in by EMS for manic/assaultive behavior.    On assessment patient is hostile, irritable, illogical, grandiose, disheveled, uncoopeartive. Collateral suggests about 1 week of manic symptoms including PMA, repetitive goal directed activity, talkative, loose associations, poor sleep, irritable mood. She is not currently in outpatient treatment. She has been hostile and threatening.    Plan:  - admit to [facility] on 9.27  - PRN: Haldol 5mg PO/IM q6h PRN agitation, Benadryl 50mg PO/IM q6h PRN EPS ppx, Ativan 2mg PO/IM q6h PRN agitation    - start risperdal 2mg qhs for acute rach  - start ativan 1mg qhs for acute rach  - does not need 1:1 on inpatient unit

## 2024-08-03 NOTE — ED ADULT TRIAGE NOTE - CHIEF COMPLAINT QUOTE
Pt presents with aggressive manic behavior at home. Pt refusing to answer question in triage. PHX bipolar disorder. Pt presents with aggressive manic behavior at home. Denies SI/HI, drug or alcohol use. Pt refusing to answer question in triage. PHX bipolar disorder.

## 2024-08-03 NOTE — ED BEHAVIORAL HEALTH ASSESSMENT NOTE - DESCRIPTION
none known agitated, refusing to answer questions in triage    Vital Signs Last 24 Hrs  T(C): 36.7 (03 Aug 2024 22:59), Max: 36.7 (03 Aug 2024 22:59)  T(F): 98 (03 Aug 2024 22:59), Max: 98 (03 Aug 2024 22:59)  HR: 94 (03 Aug 2024 22:59) (94 - 94)  BP: 133/90 (03 Aug 2024 22:59) (133/90 - 133/90)  BP(mean): --  RR: 19 (03 Aug 2024 22:59) (19 - 19)  SpO2: 99% (03 Aug 2024 22:59) (99% - 99%)    Parameters below as of 03 Aug 2024 22:59  Patient On (Oxygen Delivery Method): room air recently

## 2024-08-03 NOTE — ED ADULT TRIAGE NOTE - SOURCE OF INFORMATION
Patient Complex Repair And Flap Additional Text (Will Appearing After The Standard Complex Repair Text): The complex repair was not sufficient to completely close the primary defect. The remaining additional defect was repaired with the flap mentioned below.

## 2024-08-03 NOTE — ED BEHAVIORAL HEALTH ASSESSMENT NOTE - HPI (INCLUDE ILLNESS QUALITY, SEVERITY, DURATION, TIMING, CONTEXT, MODIFYING FACTORS, ASSOCIATED SIGNS AND SYMPTOMS)
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    EMS reported that patient was assaultive and combative prior to arrival. In triage she refuses to answer question.  On assessment she is highly agitated, yelling about her sister's  and the olympics. She is threatening, posturing, aggressive, denies any recent alcohol or drug use. Denies taking any medications. Denies outpatient psychiatric treatment. Recently  her  in [facility], returned on 7/29. Refusing to answer other questions, asking interviewer to come closer to her and repeatedly asking "am I scaring you?" Threatens to throw empty water bottle at interviewer.  She is tangential with loose associations, illogical, disheveled, agitated, threatening.    See  chart note for collateral. Collateral from  suggests that she has been restless, agitated, irritable, poor sleep, using marijuana daily, excessively cleaning, not making sense.

## 2024-08-03 NOTE — ED PROVIDER NOTE - DATE/TIME 1
Dr. Amador is out of office     Labs ordered per patient request  Please schedule prior to his appt      04-Aug-2024 06:41

## 2024-08-03 NOTE — ED ADULT NURSE NOTE - NSFALLUNIVINTERV_ED_ALL_ED
Bed/Stretcher in lowest position, wheels locked, appropriate side rails in place/Call bell, personal items and telephone in reach/Instruct patient to call for assistance before getting out of bed/chair/stretcher/Non-slip footwear applied when patient is off stretcher/[place] to call system/Physically safe environment - no spills, clutter or unnecessary equipment/Purposeful proactive rounding/Room/bathroom lighting operational, light cord in reach

## 2024-08-03 NOTE — ED PROVIDER NOTE - OBJECTIVE STATEMENT
32 y/o M hx Bipolar  BIBA  secondary to  increase anxiousness.  Admits to medication compliance, however ,noted that his clozapine dose was recently reduced.   Denies any physical altercation. Denies falling, punching or kicking objects. Denies pain, SOB, fever, chills, chest/ abdominal discomfort.. Denies SI/AH/HI/VH. Denies use of alochol or illicit drugs. No evidence of physical injuries, broken skin or deformities. 34 y/o M hx Bipolar  BIBA  secondary to  increase anxiousness and rach .  Appears paranoid , expressing a flight of ideas.  Denies any physical altercation. Denies falling, punching or kicking objects. Denies pain, SOB, fever, chills, chest/ abdominal discomfort.. Denies SI/AH/HI/VH. Denies use of alochol or illicit drugs. No evidence of physical injuries, broken skin or deformities.

## 2024-08-03 NOTE — ED PROVIDER NOTE - PATIENT PORTAL LINK FT
You can access the FollowMyHealth Patient Portal offered by [facility] by registering at the following website: [email or web address]. By joining [facility]’s FollowMyHealth portal, you will also be able to view your health information using other applications (apps) compatible with our system.

## 2024-08-03 NOTE — ED ADULT NURSE NOTE - CHIEF COMPLAINT QUOTE
Pt presents with aggressive manic behavior at home. Denies SI/HI, drug or alcohol use. Pt refusing to answer question in triage. PHX bipolar disorder.

## 2024-08-03 NOTE — ED PROVIDER NOTE - CLINICAL SUMMARY MEDICAL DECISION MAKING FREE TEXT BOX
34 y/o M hx Bipolar  BIBA  secondary to  increase anxiousness.  Admits to medication compliance, however ,noted that his clozapine dose was recently reduced.   Denies any physical altercation. Denies falling, punching or kicking objects. Denies pain, SOB, fever, chills, chest/ abdominal discomfort.. Denies SI/AH/HI/VH. Denies use of alochol or illicit drugs. No evidence of physical injuries, broken skin or deformities.    Medical evaluation performed. There is no clinical evidence of intoxication or any acute medical problem requiring immediate intervention.  SW consulted. D/C via a cab 34 y/o M hx Bipolar  BIBA  secondary to  increase anxiousness and rach .  Appears paranoid , expressing a flight of ideas.  Denies any physical altercation. Denies falling, punching or kicking objects. Denies pain, SOB, fever, chills, chest/ abdominal discomfort.. Denies SI/AH/HI/VH. Denies use of alochol or illicit drugs. No evidence of physical injuries, broken skin or deformities.    Labs, Urine Tox/UA, EKG  Medical evaluation performed. There is no clinical evidence of intoxication or any acute medical problem requiring immediate intervention. Patient is awaiting psychiatric consultation. Final disposition will be determined by psychiatrist.

## 2024-08-03 NOTE — ED ADULT NURSE NOTE - OBJECTIVE STATEMENT
Pt arrives to [facility] department via EMS for aggressive behavior. As per EMS, pt's  called 911 because pt was screaming throughout the house and she was unable to be redirected. EMS states pt would not answer any of there questions in route. Pt appears anxious and is pacing in  hallway. When asked about the events of the evening that led her to the hospital, pt states "Ask my sister". Pressure speech noted. Denies SI/HI or auditory/visual/tactile hallucinations. Offers no medical complaints at this time. Denies alcohol or drug consumption this evening. Placed in  chairs for psychiatric evaluation. Pending labs and psych recommendations.

## 2024-08-03 NOTE — ED BEHAVIORAL HEALTH ASSESSMENT NOTE - RISK ASSESSMENT
Risk factors: h/o psych admissions,  active substance abuse, noncompliant with treatment, not receiving treatment, unable to care for self 2/2 psychiatric illness, threatening and erratic behavior    Protective factors: no current SIIP, no h/o SA/SIB, no access to weapons, good physical health, engaged in work or school, dependent children, domiciled, intact marriage    Overall, pt is a high risk of harm to self/others and requires psychiatric admission for stabilization and safety.

## 2024-08-03 NOTE — ED ADULT TRIAGE NOTE - CADM TRG TX PRIOR TO ARRIVAL
[facility]  1960[facility] is a 57 y.o. female,Established patient, here for evaluation of the following chief complaint(s):  Vaginal Itching (Pt. C/o having vaginal itching for 2-3 days, denies any discharge. Pt. States she is on Chemo)    Results for orders placed or performed in visit on 22   Culture, Urine    Specimen: Bladder   Result Value Ref Range    Special Requests NO SPECIAL REQUESTS      Culture        10,000 to 50,000 COLONIES/mL MIXED SKIN ARJUN ISOLATED   AMB POC URINALYSIS DIP STICK MANUAL W/ MICRO BSSC   Result Value Ref Range    Color (UA POC) Yellow     Clarity (UA POC) Clear     Glucose, Urine, POC Negative Negative    Bilirubin, Urine, POC Negative Negative    Ketones, Urine, POC Negative Negative    Specific Gravity, Urine, POC 1.010 1.001 - 1.035    Blood (UA POC) Negative Negative    pH, Urine, POC 6.0 4.6 - 8.0    Protein, Urine, POC Negative Negative    Urobilinogen, POC 12 mg/dL     Nitrite, Urine, POC Negative Negative    Leukocyte Esterase, Urine, POC 1+ Negative    RBC, Urine, POC 1+     WBC, Urine, POC 1+     Epi Cells Urine, POC 2+     Bacteria Urine, POC 1+ Negative    Casts Urine, POC 0     Yeast, Urine, POC Negative     Trichomonas Urine, POC Negative             ASSESSMENT/PLAN:  1. Urinary frequency  -     AMB POC URINALYSIS DIP STICK MANUAL W/ MICRO BSSC  2. Acute cystitis without hematuria  -     Culture, Urine; Future  -     cephALEXin (KEFLEX) 250 MG capsule; Take 1 capsule by mouth 3 times daily, Disp-15 capsule, R-0Normal (Patient not taking: Reported on 2022)  -     fluconazole (DIFLUCAN) 150 MG tablet; Take 1 tablet by mouth every 7 days for 2 doses, Disp-2 tablet, R-0Normal (Patient not taking: Reported on 2022)    Increase fluids to 2 liters daily of mostly water. Monitor temperature. Return if no better in 48 hours.   To ER with high fever greater than 103, vomiting, abdominal pain, diarrhea, dehydration, not voiding greater than 6 Chronic osteomyelitis hours, severe back or flank pain. Return in about 5 days (around 12/10/2022), or if symptoms worsen or fail to improve. Subjective   SUBJECTIVE/OBJECTIVE:  Thinks she may have a yeast infection. No vaginal discharge. Itching really bad. Started Saturday. Drinking a lot of water. No fever. Has lost her hair from her chemo. Is having urinary frequency and nocturia. Nothing really tastes good. Has lost her sense of taste since starting chemo.  and other family members have been supportive. Left mastectomy. Urinary Frequency   This is a new problem. The current episode started in the past 7 days. The problem occurs intermittently. The problem has been gradually worsening. The patient is experiencing no pain. There has been no fever. She is Sexually active. There is No history of pyelonephritis. Associated symptoms include frequency and urgency. Pertinent negatives include no discharge, hematuria, hesitancy or nausea. Associated symptoms comments: itching. She has tried increased fluids for the symptoms. The treatment provided mild relief. Allergies   Allergen Reactions    Emend [Fosaprepitant Dimeglumine] Anaphylaxis     Flushing, coughing    Hydrocodone Anxiety and Other (See Comments)     Depressive state     Current Outpatient Medications   Medication Sig    Loratadine (CLARITIN PO) Take by mouth    meloxicam (MOBIC) 7.5 MG tablet Take 7.5 mg by mouth daily (Patient not taking: Reported on 12/12/2022)    cephALEXin (KEFLEX) 250 MG capsule Take 1 capsule by mouth 3 times daily (Patient not taking: Reported on 12/12/2022)    lidocaine-prilocaine (EMLA) 2.5-2.5 % cream Apply topically weekly as needed prior to port access.     ondansetron (ZOFRAN) 8 MG tablet Take 1 tablet by mouth every 8 hours as needed for Nausea or Vomiting    prochlorperazine (COMPAZINE) 10 MG tablet Take 1 tablet by mouth every 6 hours as needed (nausea vomiting chemo)    Mastectomy Bra MISC by Does not apply route Mastectomy bra + prosthesis    Multiple Vitamin (MULTI-VITAMIN DAILY PO) Take 1 tablet by mouth daily    Biotin 1000 MCG TABS Take 1 tablet by mouth daily (Patient not taking: Reported on 12/12/2022)    mirtazapine (REMERON) 15 MG tablet Take 0.5 tablets by mouth nightly (Patient taking differently: Take 7.5 mg by mouth as needed)    rosuvastatin (CRESTOR) 20 MG tablet Take 1 tablet by mouth at bedtime    methIMAzole (TAPAZOLE) 5 MG tablet Take 0.5 tablets by mouth daily    cyanocobalamin 2500 MCG SUBL Take 2,500 mcg by mouth daily (Patient not taking: Reported on 12/12/2022)    omeprazole (PRILOSEC) 20 MG delayed release capsule Take 20 mg by mouth as needed PRN    Red Yeast Rice Extract 600 MG CAPS Take 600 mg by mouth daily    SUTAB [id number] MG TABS TAKE AS DIRECTED (Patient not taking: No sig reported)     No current facility-administered medications for this visit. Facility-Administered Medications Ordered in Other Visits   Medication Dose Route Frequency    sodium chloride flush 0.9 % injection 10 mL  10 mL IntraVENous PRN         Review of Systems   Constitutional:  Negative for fatigue. HENT:  Negative for congestion, hearing loss, postnasal drip and rhinorrhea. Eyes:  Negative for pain, discharge and visual disturbance. Respiratory:  Negative for cough, chest tightness, shortness of breath and wheezing. Cardiovascular:  Negative for chest pain, palpitations and leg swelling. Gastrointestinal:  Negative for blood in stool and nausea. Genitourinary:  Positive for frequency and urgency. Negative for dysuria, hematuria and hesitancy. Neurological:  Negative for dizziness, tremors and seizures. Psychiatric/Behavioral:  Negative for decreased concentration and sleep disturbance. The patient is not nervous/anxious.        /70 (Site: Left Upper Arm, Position: Sitting, Cuff Size: Large Adult)   Pulse 89   Temp 97.4 °F (36.3 °C) (Temporal)   Resp 18   Ht 5' 2\" (1.575 m)   Wt 162 lb (73.5 kg)   SpO2 97%   BMI 29.63 kg/m²       Objective   Physical Exam  Constitutional:       Appearance: Normal appearance. HENT:      Head: Normocephalic and atraumatic. Right Ear: Tympanic membrane, ear canal and external ear normal.      Left Ear: Tympanic membrane, ear canal and external ear normal.      Nose: Nose normal.      Mouth/Throat:      Mouth: Mucous membranes are moist.   Eyes:      Extraocular Movements: Extraocular movements intact. Conjunctiva/sclera: Conjunctivae normal.      Pupils: Pupils are equal, round, and reactive to light. Cardiovascular:      Rate and Rhythm: Normal rate and regular rhythm. Pulses: Normal pulses. Heart sounds: Normal heart sounds. Pulmonary:      Effort: Pulmonary effort is normal.      Breath sounds: Normal breath sounds. Abdominal:      General: Bowel sounds are normal.   Musculoskeletal:         General: Normal range of motion. Cervical back: Normal range of motion. Skin:     General: Skin is warm and dry. Neurological:      General: No focal deficit present. Mental Status: She is alert and oriented to person, place, and time. Psychiatric:         Mood and Affect: Mood normal.         Behavior: Behavior normal.         Thought Content: Thought content normal.         Judgment: Judgment normal.      PHQ-9 Total Score: 0 (12/12/2022 12:30 PM)  Body mass index is 29.63 kg/m². On this date 12/5/2022 I have spent 30 minutes reviewing previous notes, test results and face to face with the patient discussing the diagnosis and importance of compliance with the treatment plan as well as documenting on the day of the visit. An electronic signature was used to authenticate this note.     --MIR Major - CNP none

## 2024-08-03 NOTE — ED BEHAVIORAL HEALTH ASSESSMENT NOTE - NSBHATTESTCOMMENTATTENDFT_PSY_A_CORE
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  During evaluation patient is manic, irritable, loud, presents with mood lability, she is angry with her sister and her sister's , talks about Halloween, presents with racing thoughts . Patient is difficult to follow, she becomes agitated when was talking about her sister, she is somewhat grandiose; states that she has 3 degree and planning to go to law degree. She hasn't been sleeping and feels hyper, patient very irritable and gets agitated easily She need psych admission for stabilization and safety, will restart psych meds

## 2024-08-04 DIAGNOSIS — F31.9 BIPOLAR DISORDER, UNSPECIFIED: ICD-10-CM

## 2024-08-04 LAB
ALBUMIN SERPL ELPH-MCNC: 4.4 G/DL — SIGNIFICANT CHANGE UP (ref 3.3–5)
ALP SERPL-CCNC: 58 U/L — SIGNIFICANT CHANGE UP (ref 40–120)
ALT FLD-CCNC: 10 U/L — SIGNIFICANT CHANGE UP (ref 4–33)
AMPHET UR-MCNC: NEGATIVE — SIGNIFICANT CHANGE UP
ANION GAP SERPL CALC-SCNC: 13 MMOL/L — SIGNIFICANT CHANGE UP (ref 7–14)
APAP SERPL-MCNC: <10 UG/ML — LOW (ref 15–25)
APPEARANCE UR: ABNORMAL
AST SERPL-CCNC: 12 U/L — SIGNIFICANT CHANGE UP (ref 4–32)
BACTERIA # UR AUTO: ABNORMAL /HPF
BARBITURATES UR SCN-MCNC: NEGATIVE — SIGNIFICANT CHANGE UP
BASOPHILS # BLD AUTO: 0.06 K/UL — SIGNIFICANT CHANGE UP (ref 0–0.2)
BASOPHILS NFR BLD AUTO: 0.6 % — SIGNIFICANT CHANGE UP (ref 0–2)
BENZODIAZ UR-MCNC: NEGATIVE — SIGNIFICANT CHANGE UP
BILIRUB SERPL-MCNC: 0.4 MG/DL — SIGNIFICANT CHANGE UP (ref 0.2–1.2)
BILIRUB UR-MCNC: NEGATIVE — SIGNIFICANT CHANGE UP
BUN SERPL-MCNC: 7 MG/DL — SIGNIFICANT CHANGE UP (ref 7–23)
CALCIUM SERPL-MCNC: 9.4 MG/DL — SIGNIFICANT CHANGE UP (ref 8.4–10.5)
CAST: 1 /LPF — SIGNIFICANT CHANGE UP (ref 0–4)
CHLORIDE SERPL-SCNC: 99 MMOL/L — SIGNIFICANT CHANGE UP (ref 98–107)
CO2 SERPL-SCNC: 22 MMOL/L — SIGNIFICANT CHANGE UP (ref 22–31)
COCAINE METAB.OTHER UR-MCNC: NEGATIVE — SIGNIFICANT CHANGE UP
COLOR SPEC: YELLOW — SIGNIFICANT CHANGE UP
CREAT SERPL-MCNC: 0.56 MG/DL — SIGNIFICANT CHANGE UP (ref 0.5–1.3)
CREATININE URINE RESULT, DAU: 89 MG/DL — SIGNIFICANT CHANGE UP
DIFF PNL FLD: NEGATIVE — SIGNIFICANT CHANGE UP
EGFR: 122 ML/MIN/1.73M2 — SIGNIFICANT CHANGE UP
EOSINOPHIL # BLD AUTO: 0.03 K/UL — SIGNIFICANT CHANGE UP (ref 0–0.5)
EOSINOPHIL NFR BLD AUTO: 0.3 % — SIGNIFICANT CHANGE UP (ref 0–6)
ETHANOL SERPL-MCNC: <10 MG/DL — SIGNIFICANT CHANGE UP
FENTANYL UR QL SCN: NEGATIVE — SIGNIFICANT CHANGE UP
GLUCOSE SERPL-MCNC: 101 MG/DL — HIGH (ref 70–99)
GLUCOSE UR QL: NEGATIVE MG/DL — SIGNIFICANT CHANGE UP
HCG SERPL-ACNC: <1 MIU/ML — SIGNIFICANT CHANGE UP
HCT VFR BLD CALC: 40 % — SIGNIFICANT CHANGE UP (ref 34.5–45)
HGB BLD-MCNC: 13.3 G/DL — SIGNIFICANT CHANGE UP (ref 11.5–15.5)
IANC: 6.59 K/UL — SIGNIFICANT CHANGE UP (ref 1.8–7.4)
IMM GRANULOCYTES NFR BLD AUTO: 0.3 % — SIGNIFICANT CHANGE UP (ref 0–0.9)
KETONES UR-MCNC: 40 MG/DL
LEUKOCYTE ESTERASE UR-ACNC: NEGATIVE — SIGNIFICANT CHANGE UP
LYMPHOCYTES # BLD AUTO: 2.05 K/UL — SIGNIFICANT CHANGE UP (ref 1–3.3)
LYMPHOCYTES # BLD AUTO: 21.9 % — SIGNIFICANT CHANGE UP (ref 13–44)
MCHC RBC-ENTMCNC: 28.7 PG — SIGNIFICANT CHANGE UP (ref 27–34)
MCHC RBC-ENTMCNC: 33.3 GM/DL — SIGNIFICANT CHANGE UP (ref 32–36)
MCV RBC AUTO: 86.4 FL — SIGNIFICANT CHANGE UP (ref 80–100)
METHADONE UR-MCNC: NEGATIVE — SIGNIFICANT CHANGE UP
MONOCYTES # BLD AUTO: 0.58 K/UL — SIGNIFICANT CHANGE UP (ref 0–0.9)
MONOCYTES NFR BLD AUTO: 6.2 % — SIGNIFICANT CHANGE UP (ref 2–14)
NEUTROPHILS # BLD AUTO: 6.59 K/UL — SIGNIFICANT CHANGE UP (ref 1.8–7.4)
NEUTROPHILS NFR BLD AUTO: 70.7 % — SIGNIFICANT CHANGE UP (ref 43–77)
NITRITE UR-MCNC: NEGATIVE — SIGNIFICANT CHANGE UP
NRBC # BLD: 0 /100 WBCS — SIGNIFICANT CHANGE UP (ref 0–0)
NRBC # FLD: 0 K/UL — SIGNIFICANT CHANGE UP (ref 0–0)
OPIATES UR-MCNC: NEGATIVE — SIGNIFICANT CHANGE UP
OXYCODONE UR-MCNC: NEGATIVE — SIGNIFICANT CHANGE UP
PCP SPEC-MCNC: SIGNIFICANT CHANGE UP
PCP UR-MCNC: NEGATIVE — SIGNIFICANT CHANGE UP
PH UR: 6.5 — SIGNIFICANT CHANGE UP (ref 5–8)
PLATELET # BLD AUTO: 337 K/UL — SIGNIFICANT CHANGE UP (ref 150–400)
POTASSIUM SERPL-MCNC: 3.8 MMOL/L — SIGNIFICANT CHANGE UP (ref 3.5–5.3)
POTASSIUM SERPL-SCNC: 3.8 MMOL/L — SIGNIFICANT CHANGE UP (ref 3.5–5.3)
PROT SERPL-MCNC: 7.8 G/DL — SIGNIFICANT CHANGE UP (ref 6–8.3)
PROT UR-MCNC: NEGATIVE MG/DL — SIGNIFICANT CHANGE UP
RBC # BLD: 4.63 M/UL — SIGNIFICANT CHANGE UP (ref 3.8–5.2)
RBC # FLD: 12.8 % — SIGNIFICANT CHANGE UP (ref 10.3–14.5)
RBC CASTS # UR COMP ASSIST: 11 /HPF — HIGH (ref 0–4)
SALICYLATES SERPL-MCNC: <0.3 MG/DL — LOW (ref 15–30)
SARS-COV-2 RNA SPEC QL NAA+PROBE: SIGNIFICANT CHANGE UP
SODIUM SERPL-SCNC: 134 MMOL/L — LOW (ref 135–145)
SP GR SPEC: 1.01 — SIGNIFICANT CHANGE UP (ref 1–1.03)
SQUAMOUS # UR AUTO: 32 /HPF — HIGH (ref 0–5)
THC UR QL: POSITIVE
TOXICOLOGY SCREEN, DRUGS OF ABUSE, SERUM RESULT: SIGNIFICANT CHANGE UP
TSH SERPL-MCNC: 1.81 UIU/ML — SIGNIFICANT CHANGE UP (ref 0.27–4.2)
UROBILINOGEN FLD QL: 1 MG/DL — SIGNIFICANT CHANGE UP (ref 0.2–1)
WBC # BLD: 9.34 K/UL — SIGNIFICANT CHANGE UP (ref 3.8–10.5)
WBC # FLD AUTO: 9.34 K/UL — SIGNIFICANT CHANGE UP (ref 3.8–10.5)
WBC UR QL: 9 /HPF — HIGH (ref 0–5)

## 2024-08-04 RX ORDER — LORAZEPAM 1 MG/1
2 TABLET ORAL EVERY 6 HOURS
Refills: 0 | Status: DISCONTINUED | OUTPATIENT
Start: 2024-08-04 | End: 2024-08-06

## 2024-08-04 RX ORDER — LORAZEPAM 1 MG/1
1 TABLET ORAL AT BEDTIME
Refills: 0 | Status: DISCONTINUED | OUTPATIENT
Start: 2024-08-04 | End: 2024-08-05

## 2024-08-04 RX ORDER — RISPERIDONE 1 MG/1
2 TABLET, FILM COATED ORAL AT BEDTIME
Refills: 0 | Status: DISCONTINUED | OUTPATIENT
Start: 2024-08-04 | End: 2024-08-06

## 2024-08-04 RX ORDER — PRAMIPEXOLE DIHYDROCHLORIDE 0.5 MG/1
5 TABLET ORAL EVERY 6 HOURS
Refills: 0 | Status: DISCONTINUED | OUTPATIENT
Start: 2024-08-04 | End: 2024-08-09

## 2024-08-04 RX ORDER — DIPHENHYDRAMINE HCL 25 MG
50 CAPSULE ORAL EVERY 6 HOURS
Refills: 0 | Status: DISCONTINUED | OUTPATIENT
Start: 2024-08-04 | End: 2024-08-09

## 2024-08-04 RX ADMIN — RISPERIDONE 2 MILLIGRAM(S): 1 TABLET, FILM COATED ORAL at 20:42

## 2024-08-04 RX ADMIN — Medication 50 MILLIGRAM(S): at 10:54

## 2024-08-04 RX ADMIN — PRAMIPEXOLE DIHYDROCHLORIDE 5 MILLIGRAM(S): 0.5 TABLET ORAL at 10:54

## 2024-08-04 RX ADMIN — LORAZEPAM 1 MILLIGRAM(S): 1 TABLET ORAL at 20:42

## 2024-08-04 RX ADMIN — LORAZEPAM 2 MILLIGRAM(S): 1 TABLET ORAL at 10:54

## 2024-08-04 NOTE — ED BEHAVIORAL HEALTH NOTE - BEHAVIORAL HEALTH NOTE
SW requested to obtain collateral information.  Following information provided by pt’s spouse Sea [phone].      Patient was brought to  for evaluation due to concerns about her behavior. Spouse reported pt has not been at her baseline since 8/29/2024.  Spouse reported he and patient were  in [facility] on  8/27/2024. Prior to the wedding, he knew pt was somewhat nervous about the wedding, but her behavior appeared normal.  Upon their return from [facility], pt began to start cleaning the home excessively, moving round furniture, moving item from one room to another.  Since Monday, pt has been sleeping about 2-4 hours at night.  She is constantly moving, not able to stay still. Today, pt began yelling and screaming, talking about incidents that have occurred in the past with family members such as her father and sister. Pt also started to throw items around the house.  Spouse reported pt is talking to herself about her past “traumas” (did not disclose details) but has not made any statements about suicidal or homicidal ideation.  No changes to eating habits reported.      Per spouse, pt had a psychiatric admission in 2020 at [facility] after consuming shrooms. Pt does not have a psychiatrist or therapist and is not taking any prescribed medication.  Spouse reported pt consumes marijuana daily and drinks socially.  Pt drank a few beers on Wednesday.  Pt does not have any access to weapons or guns.      Pt resides with her spouse; she is employed fulltime as a teacher.      Information provided to Dr. Alcantar.

## 2024-08-04 NOTE — ED ADULT NURSE REASSESSMENT NOTE - NS ED NURSE REASSESS COMMENT FT1
Break RN: Pt resting in stretcher, respirations equal and unlabored. Pt offers no complaints. Pt boarding, awaiting bed assignment. Safety maintained.
Pt lying in stretcher w/ eyes closed. Easy to arouse via tactile and verbal stimuli. Shows no signs of acute distress. VSS. Respirations even and unlabored. Offers no medical complaints at this time. Currently boarding. Pending transfer to psychiatric facility when bed becomes available.
v/s as noted.  pt awake and alert, enroute to [facility] with EMS

## 2024-08-04 NOTE — ED BEHAVIORAL HEALTH NOTE - BEHAVIORAL HEALTH NOTE
patient is admitted to [facility]  (2N) patient is admitted to [facility]  (2N)  Spoke with the  re admission, phone number to 2N given.

## 2024-08-04 NOTE — BH PATIENT PROFILE - HOME MEDICATIONS
Home
LORazepam 2 mg oral tablet , 1 tab(s) orally once a day (at bedtime), As Needed -agitation MDD:2MG  lithium 450 mg oral tablet, extended release , 2 tab(s) orally once a day (at bedtime)  risperiDONE 0.5 mg oral tablet , 1 tab(s) orally once a day (at bedtime)

## 2024-08-05 LAB
APPEARANCE UR: CLEAR — SIGNIFICANT CHANGE UP
BILIRUB UR-MCNC: NEGATIVE — SIGNIFICANT CHANGE UP
CHOLEST SERPL-MCNC: 220 MG/DL — HIGH
COLOR SPEC: YELLOW — SIGNIFICANT CHANGE UP
DIFF PNL FLD: NEGATIVE — SIGNIFICANT CHANGE UP
GLUCOSE UR QL: NEGATIVE MG/DL — SIGNIFICANT CHANGE UP
HDLC SERPL-MCNC: 69 MG/DL — SIGNIFICANT CHANGE UP
KETONES UR-MCNC: NEGATIVE MG/DL — SIGNIFICANT CHANGE UP
LEUKOCYTE ESTERASE UR-ACNC: NEGATIVE — SIGNIFICANT CHANGE UP
LIPID PNL WITH DIRECT LDL SERPL: 132 MG/DL — HIGH
NITRITE UR-MCNC: NEGATIVE — SIGNIFICANT CHANGE UP
NON HDL CHOLESTEROL: 151 MG/DL — HIGH
PH UR: 6.5 — SIGNIFICANT CHANGE UP (ref 5–8)
PROT UR-MCNC: NEGATIVE MG/DL — SIGNIFICANT CHANGE UP
SP GR SPEC: 1.01 — SIGNIFICANT CHANGE UP (ref 1–1.03)
TRIGL SERPL-MCNC: 95 MG/DL — SIGNIFICANT CHANGE UP
UROBILINOGEN FLD QL: 0.2 MG/DL — SIGNIFICANT CHANGE UP (ref 0.2–1)

## 2024-08-05 PROCEDURE — 99222 1ST HOSP IP/OBS MODERATE 55: CPT | Mod: GC

## 2024-08-05 RX ORDER — PRAMIPEXOLE DIHYDROCHLORIDE 0.5 MG/1
5 TABLET ORAL ONCE
Refills: 0 | Status: DISCONTINUED | OUTPATIENT
Start: 2024-08-05 | End: 2024-08-09

## 2024-08-05 RX ORDER — CLONAZEPAM 0.5 MG/1
0.5 TABLET ORAL AT BEDTIME
Refills: 0 | Status: DISCONTINUED | OUTPATIENT
Start: 2024-08-05 | End: 2024-08-06

## 2024-08-05 RX ORDER — DIPHENHYDRAMINE HCL 25 MG
50 CAPSULE ORAL ONCE
Refills: 0 | Status: DISCONTINUED | OUTPATIENT
Start: 2024-08-05 | End: 2024-08-09

## 2024-08-05 RX ORDER — LORAZEPAM 1 MG/1
2 TABLET ORAL ONCE
Refills: 0 | Status: DISCONTINUED | OUTPATIENT
Start: 2024-08-05 | End: 2024-08-06

## 2024-08-05 RX ADMIN — CLONAZEPAM 0.5 MILLIGRAM(S): 0.5 TABLET ORAL at 20:45

## 2024-08-05 RX ADMIN — RISPERIDONE 2 MILLIGRAM(S): 1 TABLET, FILM COATED ORAL at 20:45

## 2024-08-05 NOTE — BH PSYCHOLOGY - GROUP THERAPY NOTE - TOKEN PULL-DIAGNOSIS
Primary Diagnosis:  Bipolar disorder, current episode manic, severe [F31.13]        Problem Dx:   Bipolar disorder, current episode manic, severe [F31.13]
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
## 2024-08-05 NOTE — BH INPATIENT PSYCHIATRY ASSESSMENT NOTE - NSBHASSESSSUMMFT_PSY_ALL_CORE
33 y/o F with bipolar disorder not on medications; , domiciled with , no dependents, employed as ; recent graduated with master's in education; no pmhx; pphx of 2 prior psych hospitalizations ([facility] 2020, [facility] 2010); no hx of SA; hx of substance use (shrooms, thc); no hx of aggression BIBEMS for agitation and verbal aggression.     Currently, the patient is with improving symptoms of rach and agitation compared to her initial presentation in the ED. Current ddx is primary manic episode vs substance induced mood episode; substance induced likely as patient has improved substantially s/p one dose of Risperdal 2. Will continue tx with SGA and bzd for acute rach and sleep. Will consider adding mood stabilizer at a later time. Patient in agreement with medication and treatment plan.   Continued inpatient admission required for stabilization, medication optimization, safe discharge planning.    Plan:  1. Legal: Admitted on/continue 9.39 status  2. Safety: No reported SI/SIB/HI/VI currently on unit; continue routine observation.  - Haldol 5/Ativan 2/Benadryl 50 PRN medications for safety/agitation  3. Psychiatric:  - c/w Risperdal 2 qHS; titrate until effect and tolerability. Can consider conversion to [place] GARZA once tolerability established  - START Klonopin 0.5 qHS; discontinue Ativan 1 qHS   4. Therapy: individual, group & milieu therapy  5. Medical: No acute medical needs identified  6. Collateral: Collateral from ; see  chart note  7. Disposition: When stable/pending clinical improvement   35 y/o F with bipolar disorder not on medications; , domiciled with , no dependents, employed as ; recent graduated with master's in education; no pmhx; pphx of 2 prior psych hospitalizations ([facility] 2020, [facility] 2010); no hx of SA; hx of substance use (shrooms, thc); no hx of aggression BIBEMS for agitation and verbal aggression.     Currently, the patient is with improving symptoms of rach and agitation compared to her initial presentation in the ED. Current ddx is primary manic episode vs substance induced mood episode; substance induced likely as patient has improved substantially s/p one dose of Risperdal 2mg. Will continue tx with SGA and bzd for acute rach and sleep. Will consider adding mood stabilizer at a later time. Patient in agreement with medication and treatment plan.   Continued inpatient admission required for stabilization, medication optimization, safe discharge planning.    Plan:  1. Legal: Admitted on/continue 9.39 status  2. Safety: No reported SI/SIB/HI/VI currently on unit; continue routine observation.  - Haldol 5/Ativan 2/Benadryl 50 PRN medications for safety/agitation  3. Psychiatric:  - c/w Risperdal 2 qHS; titrate until effect and tolerability. Can consider conversion to DE [place] GARZA once tolerability established  - START Klonopin 0.5 qHS; discontinue Ativan 1 qHS   4. Therapy: individual, group & milieu therapy  5. Medical: No acute medical needs identified  6. Collateral: Collateral from ; see  chart note  7. Disposition: When stable/pending clinical improvement

## 2024-08-05 NOTE — BH PSYCHOLOGY - GROUP THERAPY NOTE - NSPSYCHOLGRPCOGPT_PSY_A_CORE
other... Spironolactone Counseling: Patient advised regarding risks of diarrhea, abdominal pain, hyperkalemia, birth defects (for female patients), liver toxicity and renal toxicity. The patient may need blood work to monitor liver and kidney function and potassium levels while on therapy. The patient verbalized understanding of the proper use and possible adverse effects of spironolactone.  All of the patient's questions and concerns were addressed.

## 2024-08-05 NOTE — BH INPATIENT PSYCHIATRY ASSESSMENT NOTE - NSBHCHARTREVIEWLAB_PSY_A_CORE FT
13.3   9.34  )-----------( 337      ( 03 Aug 2024 23:50 )             40.0   08-03    134<L>  |  99  |  7   ----------------------------<  101<H>  3.8   |  22  |  0.56    Ca    9.4      03 Aug 2024 23:50    TPro  7.8  /  Alb  4.4  /  TBili  0.4  /  DBili  x   /  AST  12  /  ALT  10  /  AlkPhos  58  08-03

## 2024-08-05 NOTE — BH INPATIENT PSYCHIATRY ASSESSMENT NOTE - RISK ASSESSMENT
On admission, patient has acutely elevated risk factors of harm to self and others, as s On admission, patient has acutely elevated risk factors of harm to self and others, given her non-adherence to psychotropic medications despite her psych hx, active substance use, threatening and erratic behavior which were elevated above her chronic risk factors of hx of psych hospitalizations, hx of bipolar disorder untreated. Protective factors that mitigate risk: no SIIP/HIIP at time of admission, no h/o SA, no hx of NSSIB, no access to weapons, good physical health, engaged in work or school, dependent children, domiciled, intact marriage. Pt is thus at a moderate risk of harm to others and requires continued psychiatric admission for stabilization and safety.

## 2024-08-05 NOTE — BH INPATIENT PSYCHIATRY ASSESSMENT NOTE - NSSUICPROTFACT_PSY_ALL_CORE
Responsibility to children, family, or others/Identifies reasons for living/Supportive social network of family or friends/Cultural, spiritual and/or moral attitudes against suicide/Engaged in work or school/Positive therapeutic relationships/Muslim beliefs

## 2024-08-05 NOTE — BH INPATIENT PSYCHIATRY ASSESSMENT NOTE - NSBHMETABOLIC_PSY_ALL_CORE_FT
BMI:   HbA1c:   Glucose:   BP: 160/82 (08-04-24 @ 09:58) (133/90 - 160/82)Vital Signs Last 24 Hrs  T(C): 37.1 (08-05-24 @ 07:46), Max: 37.1 (08-05-24 @ 07:46)  T(F): 98.8 (08-05-24 @ 07:46), Max: 98.8 (08-05-24 @ 07:46)  HR: --  BP: --  BP(mean): --  RR: 17 (08-05-24 @ 07:46) (17 - 17)  SpO2: --    Orthostatic VS  08-05-24 @ 07:46  Lying BP: --/-- HR: --  Sitting BP: 134/84 HR: 107  Standing BP: 129/85 HR: 118  Site: --  Mode: --  Orthostatic VS  08-04-24 @ 10:35  Lying BP: --/-- HR: --  Sitting BP: 150/109 HR: 129  Standing BP: 141/69 HR: 122  Site: --  Mode: --    Lipid Panel: Date/Time: 08-05-24 @ 08:32  Cholesterol, Serum: 220  LDL Cholesterol Calculated: 132  HDL Cholesterol, Serum: 69  Total Cholesterol/HDL Ration Measurement: --  Triglycerides, Serum: 95

## 2024-08-05 NOTE — BH INPATIENT PSYCHIATRY ASSESSMENT NOTE - NSTXMANICGOAL_PSY_ALL_CORE
Be able to identify the early signs of rach (e.g. sleep and mood changes) and to employ coping strategies to minimize acting out

## 2024-08-05 NOTE — BH INPATIENT PSYCHIATRY ASSESSMENT NOTE - NSBHATTESTCOMMENTATTENDFT_PSY_A_CORE
Pt admitted with disorganization and agitation concerning for rach - etiology may be bipolar vs substance induced vs both.  Pt currently exhibiting attenuation of symptoms since yesterday.  Continue with risperidone.  Would avoid any psychotropics with elevated risk of teratogenic effects (e.g. Depakote) as pt reports she and her spouse do not use any contraceptive methods.

## 2024-08-05 NOTE — BH SOCIAL WORK INITIAL PSYCHOSOCIAL EVALUATION - NSCMSPTSTRENGTHS_PSY_ALL_CORE
Assertive/Expressive of emotions/Marina/spirituality/Financial stability/Future/goal oriented/Intact employment/Intelligence/Leisure interest/Motivated/Physically healthy/Resourceful/Strong support system

## 2024-08-05 NOTE — BH INPATIENT PSYCHIATRY ASSESSMENT NOTE - OTHER PAST PSYCHIATRIC HISTORY (INCLUDE DETAILS REGARDING ONSET, COURSE OF ILLNESS, INPATIENT/OUTPATIENT TREATMENT)
Inpatient psychiatric hospitalization x2: [facility] 2020, [facility] 2010  follows with outpatient therapist Luis Carlos Valenzuela LCSW at [facility]
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
## 2024-08-05 NOTE — BH PSYCHOLOGY - GROUP THERAPY NOTE - NSPSYCHOLGRPCOGPT_PSY_A_CORE FT
Patient attended Cognitive Behavioral Therapy Group incorporating ACT-based concepts. The group started with a brief check-in asking Pts to share one moment that occurred over the past week that they are proud of. Members then engaged in reviewing a “DOTS” coping strategies worksheet, which highlights the mind/body connection. Lastly, the group focused on engaging in a discussion about value identification. Pt's were encouraged to read aloud from values discussion cards and reflect on which individual values come up for them in their answers. Group facilitator explained concepts, reinforced participation, and engaged patients in the discussion.

## 2024-08-05 NOTE — BH INPATIENT PSYCHIATRY ASSESSMENT NOTE - NSBHCHARTREVIEWVS_PSY_A_CORE FT
Vital Signs Last 24 Hrs  T(C): 37.1 (08-05-24 @ 07:46), Max: 37.1 (08-05-24 @ 07:46)  T(F): 98.8 (08-05-24 @ 07:46), Max: 98.8 (08-05-24 @ 07:46)  HR: --  BP: --  BP(mean): --  RR: 17 (08-05-24 @ 07:46) (17 - 17)  SpO2: --    Orthostatic VS  08-05-24 @ 07:46  Lying BP: --/-- HR: --  Sitting BP: 134/84 HR: 107  Standing BP: 129/85 HR: 118  Site: --  Mode: --  Orthostatic VS  08-04-24 @ 10:35  Lying BP: --/-- HR: --  Sitting BP: 150/109 HR: 129  Standing BP: 141/69 HR: 122  Site: --  Mode: --

## 2024-08-05 NOTE — BH INPATIENT PSYCHIATRY ASSESSMENT NOTE - MSE UNSTRUCTURED FT
POOJA/BEH: Adult female in no acute distress; dressed in street clothes; guarded, hostile, irritable; fair eye contact.  SPEECH: overproductive but not pressured  MOTOR: No PMR/PMA; no other abnormal movements.  MOOD: “annoyed"  AFFECT: labile; mood congruent  THOUGHT PROCESS: circumstantial  THOUGHT CONTENT: Denies SI or HI; no evidence of delusions, no AVH  COGNITION: Grossly intact.  INSIGHT: impaired  JUDGMENT: limited  IMPULSE CONTROL: Intact on unit.  POOJA/BEH: Adult female in no acute distress; dressed in street clothes; guarded, hostile, irritable; fair eye contact.  SPEECH: overproductive  MOTOR: No PMR/PMA; no other abnormal movements.  MOOD: “annoyed"  AFFECT: labile; mood congruent  THOUGHT PROCESS: circumstantial  THOUGHT CONTENT: Denies SI or HI; no evidence of delusions, no AVH  COGNITION: Grossly intact.  INSIGHT: impaired  JUDGMENT: limited  IMPULSE CONTROL: Intact on unit.   ATTENTION: Serial 7s intact.  GAIT: Intact
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
## 2024-08-05 NOTE — PSYCHIATRIC REHAB INITIAL EVALUATION - NSBHPRRECOMMEND_PSY_ALL_CORE
Patient presented tearful during initial assessment, with pressured speech and poor boundaries (standing very close to writer, giving excessive compliments). Patient demonstrated poor insight into reasons for admission (patient reports she was just being “passionate” when she was upset.) and was guarded regarding specific for current and past admissions. Writer oriented patient to unit, provided patient with a unit schedule, and introduced patient to psychiatric rehabilitation staff and department functions. Patient was oriented to safety planning protocol and was informed that safety planning will be an integral aspect of their care during their inpatient hospitalization. Writer and patient were unable to collaborate on a psychiatric rehabilitation goal, therefore one will be chosen for her. Psych rehab staff will continue to engage patient daily in order to build therapeutic rapport.

## 2024-08-05 NOTE — BH INPATIENT PSYCHIATRY ASSESSMENT NOTE - CURRENT MEDICATION
MEDICATIONS  (STANDING):  LORazepam     Tablet 1 milliGRAM(s) Oral at bedtime  risperiDONE   Tablet 2 milliGRAM(s) Oral at bedtime    MEDICATIONS  (PRN):  diphenhydrAMINE 50 milliGRAM(s) Oral every 6 hours PRN Extrapyramidal prophylaxis  haloperidol     Tablet 5 milliGRAM(s) Oral every 6 hours PRN agitation  LORazepam     Tablet 2 milliGRAM(s) Oral every 6 hours PRN Agitation

## 2024-08-05 NOTE — BH PSYCHOLOGY - GROUP THERAPY NOTE - NSBHPSYCHOLRESPCOMMENT_PSY_A_CORE FT
The patient appeared adequately groomed and casually dressed. Pt appeared very engaged in the group as evidenced by her willingness to verbally communicate independently during the discussion. During check-in Pt shared a recent moment she is proud of and highlighted today was her birthday, sharing what it was like for her to be spending the day on the unit. When discussing "DOTS" coping strategies, Pt gave examples from her own life, specifically identifying how individuals in her family could "trigger" her. During the value exploration exercise, Pt read aloud from cards when prompted and answered questions meaningfully. Speech was pressured at points. Pt responded well to redirection. PT was oriented X3. Pt was appropriate and supportive with peers.

## 2024-08-05 NOTE — BH INPATIENT PSYCHIATRY ASSESSMENT NOTE - DESCRIPTION
recently  in [facility]  social studies   recently obtained masters in education  [employer]  Recently  in [facility].  Social studies .  Recently obtained masters in education.  Identifies as [place].
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
## 2024-08-05 NOTE — BH INPATIENT PSYCHIATRY ASSESSMENT NOTE - PAST PSYCHOTROPIC MEDICATION
Lithium 900 in 2020, patient tapered off in 2021 because it made her feel "tranquilized" and "zombie-like" in addition to sexual side effects Lithium 900 mg in 2020, patient tapered off in 2021 because it made her feel sedated in addition to sexual side effects

## 2024-08-06 ENCOUNTER — OUTPATIENT (OUTPATIENT)
Dept: OUTPATIENT SERVICES | Facility: HOSPITAL | Age: 34
LOS: 1 days | Discharge: HOME | End: 2024-08-06

## 2024-08-06 LAB
A1C WITH ESTIMATED AVERAGE GLUCOSE RESULT: 4.9 % — SIGNIFICANT CHANGE UP (ref 4–5.6)
ALBUMIN SERPL ELPH-MCNC: 4.4 G/DL — SIGNIFICANT CHANGE UP (ref 3.3–5)
ALP SERPL-CCNC: 58 U/L — SIGNIFICANT CHANGE UP (ref 40–120)
ALT FLD-CCNC: 11 U/L — SIGNIFICANT CHANGE UP (ref 4–33)
ANION GAP SERPL CALC-SCNC: 16 MMOL/L — HIGH (ref 7–14)
AST SERPL-CCNC: 12 U/L — SIGNIFICANT CHANGE UP (ref 4–32)
BASOPHILS # BLD AUTO: 0.05 K/UL — SIGNIFICANT CHANGE UP (ref 0–0.2)
BASOPHILS NFR BLD AUTO: 0.9 % — SIGNIFICANT CHANGE UP (ref 0–2)
BILIRUB SERPL-MCNC: 0.5 MG/DL — SIGNIFICANT CHANGE UP (ref 0.2–1.2)
BUN SERPL-MCNC: 8 MG/DL — SIGNIFICANT CHANGE UP (ref 7–23)
CALCIUM SERPL-MCNC: 9.3 MG/DL — SIGNIFICANT CHANGE UP (ref 8.4–10.5)
CHLORIDE SERPL-SCNC: 100 MMOL/L — SIGNIFICANT CHANGE UP (ref 98–107)
CO2 SERPL-SCNC: 18 MMOL/L — LOW (ref 22–31)
CREAT SERPL-MCNC: 0.46 MG/DL — LOW (ref 0.5–1.3)
EGFR: 127 ML/MIN/1.73M2 — SIGNIFICANT CHANGE UP
EOSINOPHIL # BLD AUTO: 0.12 K/UL — SIGNIFICANT CHANGE UP (ref 0–0.5)
EOSINOPHIL NFR BLD AUTO: 2.1 % — SIGNIFICANT CHANGE UP (ref 0–6)
ESTIMATED AVERAGE GLUCOSE: 94 — SIGNIFICANT CHANGE UP
GLUCOSE SERPL-MCNC: 81 MG/DL — SIGNIFICANT CHANGE UP (ref 70–99)
HCG SERPL-ACNC: <1 MIU/ML — SIGNIFICANT CHANGE UP
HCT VFR BLD CALC: 42.8 % — SIGNIFICANT CHANGE UP (ref 34.5–45)
HGB BLD-MCNC: 14.1 G/DL — SIGNIFICANT CHANGE UP (ref 11.5–15.5)
IANC: 3.75 K/UL — SIGNIFICANT CHANGE UP (ref 1.8–7.4)
IMM GRANULOCYTES NFR BLD AUTO: 0.2 % — SIGNIFICANT CHANGE UP (ref 0–0.9)
LYMPHOCYTES # BLD AUTO: 1.4 K/UL — SIGNIFICANT CHANGE UP (ref 1–3.3)
LYMPHOCYTES # BLD AUTO: 24.1 % — SIGNIFICANT CHANGE UP (ref 13–44)
MCHC RBC-ENTMCNC: 28.3 PG — SIGNIFICANT CHANGE UP (ref 27–34)
MCHC RBC-ENTMCNC: 32.9 GM/DL — SIGNIFICANT CHANGE UP (ref 32–36)
MCV RBC AUTO: 85.9 FL — SIGNIFICANT CHANGE UP (ref 80–100)
MONOCYTES # BLD AUTO: 0.47 K/UL — SIGNIFICANT CHANGE UP (ref 0–0.9)
MONOCYTES NFR BLD AUTO: 8.1 % — SIGNIFICANT CHANGE UP (ref 2–14)
NEUTROPHILS # BLD AUTO: 3.75 K/UL — SIGNIFICANT CHANGE UP (ref 1.8–7.4)
NEUTROPHILS NFR BLD AUTO: 64.6 % — SIGNIFICANT CHANGE UP (ref 43–77)
NRBC # BLD: 0 /100 WBCS — SIGNIFICANT CHANGE UP (ref 0–0)
NRBC # FLD: 0 K/UL — SIGNIFICANT CHANGE UP (ref 0–0)
PLATELET # BLD AUTO: 321 K/UL — SIGNIFICANT CHANGE UP (ref 150–400)
POTASSIUM SERPL-MCNC: 3.5 MMOL/L — SIGNIFICANT CHANGE UP (ref 3.5–5.3)
POTASSIUM SERPL-SCNC: 3.5 MMOL/L — SIGNIFICANT CHANGE UP (ref 3.5–5.3)
PROT SERPL-MCNC: 7.6 G/DL — SIGNIFICANT CHANGE UP (ref 6–8.3)
RBC # BLD: 4.98 M/UL — SIGNIFICANT CHANGE UP (ref 3.8–5.2)
RBC # FLD: 13.3 % — SIGNIFICANT CHANGE UP (ref 10.3–14.5)
SODIUM SERPL-SCNC: 134 MMOL/L — LOW (ref 135–145)
TSH SERPL-MCNC: 1.18 UIU/ML — SIGNIFICANT CHANGE UP (ref 0.27–4.2)
WBC # BLD: 5.8 K/UL — SIGNIFICANT CHANGE UP (ref 3.8–10.5)
WBC # FLD AUTO: 5.8 K/UL — SIGNIFICANT CHANGE UP (ref 3.8–10.5)

## 2024-08-06 PROCEDURE — 99232 SBSQ HOSP IP/OBS MODERATE 35: CPT | Mod: GC

## 2024-08-06 PROCEDURE — 99222 1ST HOSP IP/OBS MODERATE 55: CPT

## 2024-08-06 RX ORDER — CLONAZEPAM 0.5 MG/1
0.5 TABLET ORAL AT BEDTIME
Refills: 0 | Status: DISCONTINUED | OUTPATIENT
Start: 2024-08-06 | End: 2024-08-09

## 2024-08-06 RX ORDER — LORAZEPAM 1 MG/1
2 TABLET ORAL ONCE
Refills: 0 | Status: DISCONTINUED | OUTPATIENT
Start: 2024-08-06 | End: 2024-08-09

## 2024-08-06 RX ORDER — LORAZEPAM 1 MG/1
2 TABLET ORAL EVERY 6 HOURS
Refills: 0 | Status: DISCONTINUED | OUTPATIENT
Start: 2024-08-06 | End: 2024-08-09

## 2024-08-06 RX ORDER — RISPERIDONE 1 MG/1
3 TABLET, FILM COATED ORAL AT BEDTIME
Refills: 0 | Status: DISCONTINUED | OUTPATIENT
Start: 2024-08-06 | End: 2024-08-09

## 2024-08-06 RX ADMIN — CLONAZEPAM 0.5 MILLIGRAM(S): 0.5 TABLET ORAL at 20:30

## 2024-08-06 RX ADMIN — RISPERIDONE 3 MILLIGRAM(S): 1 TABLET, FILM COATED ORAL at 20:30

## 2024-08-06 NOTE — CONSULT NOTE ADULT - SUBJECTIVE AND OBJECTIVE BOX
HPI: 34F admitted to [facility] for psychosis. Reported sx to psych team of UTI, said she has a history of recurrent UTIs.  Today she denies dysuria, frequency, burning or pain.    PAST MEDICAL & SURGICAL HISTORY:  Bipolar disorder      No significant past surgical history          Review of Systems:   CONSTITUTIONAL: No fever, weight loss, or fatigue  EYES: No eye pain, visual disturbances, or discharge  ENMT:  No difficulty hearing, tinnitus, vertigo; No sinus or throat pain  NECK: No pain or stiffness  RESPIRATORY: No cough, wheezing, chills or hemoptysis; No shortness of breath  CARDIOVASCULAR: No chest pain, palpitations, dizziness, or leg swelling  GASTROINTESTINAL: No abdominal or epigastric pain. No nausea, vomiting, or hematemesis; No diarrhea or constipation. No melena or hematochezia.  GENITOURINARY: No dysuria, frequency, hematuria, or incontinence  NEUROLOGICAL: No headaches, memory loss, loss of strength, numbness, or tremors  SKIN: No itching, burning, rashes, or lesions   LYMPH NODES: No enlarged glands  ENDOCRINE: No heat or cold intolerance; No hair loss  MUSCULOSKELETAL: No joint pain or swelling; No muscle, back, or extremity pain  HEME/LYMPH: No easy bruising, or bleeding gums  ALLERY AND IMMUNOLOGIC: No hives or eczema    Allergies    penicillins (Unknown)    Intolerances        Social History: no etoh tob idu    FAMILY HISTORY:  No pertinent family history in first degree relatives        MEDICATIONS  (STANDING):  clonazePAM  Tablet 0.5 milliGRAM(s) Oral at bedtime  risperiDONE   Tablet 3 milliGRAM(s) Oral at bedtime    MEDICATIONS  (PRN):  diphenhydrAMINE 50 milliGRAM(s) Oral every 6 hours PRN Extrapyramidal prophylaxis  diphenhydrAMINE Injectable 50 milliGRAM(s) IntraMuscular once PRN agitation  haloperidol     Tablet 5 milliGRAM(s) Oral every 6 hours PRN agitation  haloperidol    Injectable 5 milliGRAM(s) IntraMuscular once PRN agitation  LORazepam     Tablet 2 milliGRAM(s) Oral every 6 hours PRN Agitation  LORazepam   Injectable 2 milliGRAM(s) IntraMuscular once PRN agitation      Vital Signs Last 24 Hrs  T(C): 36.1 (06 Aug 2024 08:10), Max: 36.9 (05 Aug 2024 20:43)  T(F): 97 (06 Aug 2024 08:10), Max: 98.4 (05 Aug 2024 20:43)  HR: 89  BP: 135/91  BP(mean): --  RR: 15            PHYSICAL EXAM:  GENERAL: NAD  HEAD:  Atraumatic, Normocephalic  EYES: EOMI, conjunctiva and sclera clear  NECK: Supple, No JVD  CHEST/LUNG: Clear to auscultation bilaterally; No wheeze  HEART: Regular rate and rhythm; No murmurs, rubs, or gallops  ABDOMEN: Soft, Nontender, Nondistended; Bowel sounds present  EXTREMITIES:  2+ Peripheral Pulses, No clubbing, cyanosis, or edema  NEUROLOGY: non-focal  SKIN: No rashes or lesions    LABS:                        14.1   5.80  )-----------( 321      ( 06 Aug 2024 08:20 )             42.8     08-06    134<L>  |  100  |  8   ----------------------------<  81  3.5   |  18<L>  |  0.46<L>    Ca    9.3      06 Aug 2024 08:20    TPro  7.6  /  Alb  4.4  /  TBili  0.5  /  DBili  x   /  AST  12  /  ALT  11  /  AlkPhos  58  08-06          Urinalysis Basic - ( 06 Aug 2024 08:20 )    Color: x / Appearance: x / SG: x / pH: x  Gluc: 81 mg/dL / Ketone: x  / Bili: x / Urobili: x   Blood: x / Protein: x / Nitrite: x   Leuk Esterase: x / RBC: x / WBC x   Sq Epi: x / Non Sq Epi: x / Bacteria: x

## 2024-08-06 NOTE — BH INPATIENT PSYCHIATRY PROGRESS NOTE - NSBHCHARTREVIEWLAB_PSY_A_CORE FT
14.1   5.80  )-----------( 321      ( 06 Aug 2024 08:20 )             42.8   08-06    134<L>  |  100  |  8   ----------------------------<  81  3.5   |  18<L>  |  0.46<L>    Ca    9.3      06 Aug 2024 08:20    TPro  7.6  /  Alb  4.4  /  TBili  0.5  /  DBili  x   /  AST  12  /  ALT  11  /  AlkPhos  58  08-06  Urinalysis Basic - ( 06 Aug 2024 08:20 )    Color: x / Appearance: x / SG: x / pH: x  Gluc: 81 mg/dL / Ketone: x  / Bili: x / Urobili: x   Blood: x / Protein: x / Nitrite: x   Leuk Esterase: x / RBC: x / WBC x   Sq Epi: x / Non Sq Epi: x / Bacteria: x

## 2024-08-06 NOTE — BH INPATIENT PSYCHIATRY PROGRESS NOTE - CURRENT MEDICATION
MEDICATIONS  (STANDING):  clonazePAM  Tablet 0.5 milliGRAM(s) Oral at bedtime  risperiDONE   Tablet 2 milliGRAM(s) Oral at bedtime    MEDICATIONS  (PRN):  diphenhydrAMINE 50 milliGRAM(s) Oral every 6 hours PRN Extrapyramidal prophylaxis  diphenhydrAMINE Injectable 50 milliGRAM(s) IntraMuscular once PRN agitation  haloperidol     Tablet 5 milliGRAM(s) Oral every 6 hours PRN agitation  haloperidol    Injectable 5 milliGRAM(s) IntraMuscular once PRN agitation  LORazepam     Tablet 2 milliGRAM(s) Oral every 6 hours PRN Agitation  LORazepam   Injectable 2 milliGRAM(s) IntraMuscular once PRN agitation   MEDICATIONS  (STANDING):  clonazePAM  Tablet 0.5 milliGRAM(s) Oral at bedtime  risperiDONE   Tablet 3 milliGRAM(s) Oral at bedtime    MEDICATIONS  (PRN):  diphenhydrAMINE 50 milliGRAM(s) Oral every 6 hours PRN Extrapyramidal prophylaxis  diphenhydrAMINE Injectable 50 milliGRAM(s) IntraMuscular once PRN agitation  haloperidol     Tablet 5 milliGRAM(s) Oral every 6 hours PRN agitation  haloperidol    Injectable 5 milliGRAM(s) IntraMuscular once PRN agitation  LORazepam     Tablet 2 milliGRAM(s) Oral every 6 hours PRN Agitation  LORazepam   Injectable 2 milliGRAM(s) IntraMuscular once PRN agitation

## 2024-08-06 NOTE — BH INPATIENT PSYCHIATRY PROGRESS NOTE - NSBHASSESSSUMMFT_PSY_ALL_CORE
35 y/o F with bipolar disorder not on medications; , domiciled with , no dependents, employed as ; recent graduated with master's in education; no pmhx; pphx of 2 prior psych hospitalizations ([facility] 2020, [facility] 2010); no hx of SA; hx of substance use (shrooms, thc); no hx of aggression BIBEMS for agitation and verbal aggression.     Patient's presentation is unchanged from yesterday; she continues to have improving sxs of rach and has not required PRNs for agitation. Will continue tx with SGA and uptitrate to 3. C/w bzd for acute rach and sleep. Will consider adding mood stabilizer at a later time. Patient in agreement with medication and treatment plan.   Continued inpatient admission required for stabilization, medication optimization, safe discharge planning.    Plan:  1. Legal: Admitted on/continue 9.39 status  2. Safety: No reported SI/SIB/HI/VI currently on unit; continue routine observation.  - Haldol 5/Ativan 2/Benadryl 50 PRN medications for safety/agitation  3. Psychiatric:  - c/w Risperdal 3 qHS; titrate until effect and tolerability. Can consider conversion to DE LA GARZA once tolerability established  - c/w Klonopin 0.5 qHS  4. Therapy: individual, group & milieu therapy  5. Medical: No acute medical needs identified  6. Collateral: Collateral from ; see  chart note  7. Disposition: When stable/pending clinical improvement   35 y/o F with bipolar disorder not on medications; , domiciled with , no dependents, employed as ; recent graduated with master's in education; no pmhx; pphx of 2 prior psych hospitalizations ([facility] 2020, [facility] 2010); no hx of SA; hx of substance use (shrooms, thc); no hx of aggression BIBEMS for agitation and verbal aggression.     Patient's presentation is unchanged from yesterday; she continues to have improving sxs of rach and has not required PRNs for agitation. Will continue tx with SGA and uptitrate to 3. C/w bzd for acute rach and sleep. Will consider adding mood stabilizer at a later time. Patient in agreement with medication and treatment plan.   Continued inpatient admission required for stabilization, medication optimization, safe discharge planning.    Plan:  1. Legal: Admitted on/continue 9.39 status  2. Safety: No reported SI/SIB/HI/VI currently on unit; continue routine observation.  - Haldol 5/Ativan 2/Benadryl 50 PRN medications for safety/agitation  3. Psychiatric:  - c/w Risperdal 3 qHS; titrate until effect and tolerability. Can consider conversion to DE LA GARZA once tolerability established  - c/w Klonopin 0.5 qHS  4. Therapy: individual, group & milieu therapy  5. Medical: no evidence of UTI from UA  6. Collateral: Collateral from ; see  chart note  7. Disposition: When stable/pending clinical improvement

## 2024-08-07 LAB
CULTURE RESULTS: SIGNIFICANT CHANGE UP
SPECIMEN SOURCE: SIGNIFICANT CHANGE UP

## 2024-08-07 PROCEDURE — 99232 SBSQ HOSP IP/OBS MODERATE 35: CPT

## 2024-08-07 RX ORDER — HYDROCORTISONE 1 %
1 CREAM (GRAM) TOPICAL DAILY
Refills: 0 | Status: DISCONTINUED | OUTPATIENT
Start: 2024-08-07 | End: 2024-08-09

## 2024-08-07 RX ORDER — DIPHENHYDRAMINE HCL 25 MG
25 CAPSULE ORAL ONCE
Refills: 0 | Status: COMPLETED | OUTPATIENT
Start: 2024-08-07 | End: 2024-08-07

## 2024-08-07 RX ADMIN — Medication 50 MILLIGRAM(S): at 11:52

## 2024-08-07 RX ADMIN — Medication 25 MILLIGRAM(S): at 14:37

## 2024-08-07 RX ADMIN — CLONAZEPAM 0.5 MILLIGRAM(S): 0.5 TABLET ORAL at 21:12

## 2024-08-07 RX ADMIN — RISPERIDONE 3 MILLIGRAM(S): 1 TABLET, FILM COATED ORAL at 21:12

## 2024-08-07 RX ADMIN — Medication 1 APPLICATION(S): at 21:13

## 2024-08-07 NOTE — BH PSYCHOLOGY - CLINICIAN PSYCHOTHERAPY NOTE - NSBHPSYCHOLNARRATIVE_PSY_A_CORE FT
Writer met with Pt for a 30-minute individual therapy session. Pt was alert and presented with adequate hygiene and grooming. Pt reported that she was feeling “ready to go home to [her] .” Pt's emotional expression appeared scattered throughout the session. Pt displayed congruent affect with reported mood. Pt eye contact was good. Pt denied experiencing any A/V hallucinations. Her thought process was linear, and goal directed. Pts’ speech was WNL, and content was relevant to discussion. Pt did not endorse any SI/HI, plans, or intent. Oriented x3. Pt displayed poor-fair insight and judgement.        Session focused on discussing Pt’s recent admission to the unit, assessing for risk, exploring coping skills/protective factors, and establishing therapeutic goals. Upon check-in, Pt immediately asked writer when she would be discharged, as she feels "much better" on the current medications she is taking. Writer provided psychoeducation on inpatient hospitalizations and Pt became frustrated, sharing "[she] just got  and needs to get home to [her] ." Pt provided some relevant family history and reported she was not manic when admitted, and that how she was behaving is typical for "[place]omin[place]." Pt shared details about her relationship with her sister, and believes if she distances herself from her sister and her sister's , she will protect herself from confrontation and unhealthy situations. Writer and Pt discussed boundary setting and pros/cons to establishing boundaries with people. Pt became fixated on discharge and stated "[she] can not stay [on the unit] over the weekend." Writer introduced ACT weekend wellness booklet and Pt expressed interest in reviewing ACT skills, but requested termination of individual session, as Pt needed to use the bathroom. Writer provided Pt with support, validation, encouragement, and a safe space to share her thoughts and feelings.    Writer met with Pt for a 30-minute individual therapy session. Pt was alert and presented with adequate hygiene and grooming. Pt reported that she was feeling “ready to go home to [her] .” Pt's emotional expression appeared scattered throughout the session. Pt displayed congruent affect with reported mood. Pt eye contact was good. Pt denied experiencing any A/V hallucinations. Her thought process was linear, and goal directed. Pts’ speech was WNL, and content was relevant to discussion. Pt did not endorse any SI/HI, plans, or intent. Oriented x3. Pt displayed poor-fair insight and judgement.        Session focused on discussing Pt’s recent admission to the unit, assessing for risk, exploring coping skills/protective factors, and establishing therapeutic goals. Upon check-in, Pt immediately asked writer when she would be discharged, as she feels "much better" on the current medications she is taking. Writer provided psychoeducation on inpatient hospitalizations and Pt became frustrated, sharing "[she] just got  and needs to get home to [her] ." Pt provided some relevant family history and reported she was not manic when admitted, and that how she was behaving is "typical for [place]." Pt shared details about her relationship with her sister, and believes if she distances herself from her sister and her sister's , she will protect herself from confrontation and unhealthy situations. Writer and Pt discussed boundary setting and pros/cons to establishing boundaries with people. Pt became fixated on discharge and stated "[she] can not stay [on the unit] over the weekend." Writer introduced ACT weekend wellness booklet and Pt expressed interest in reviewing ACT skills, but requested termination of individual session, as Pt needed to use the bathroom. Writer provided Pt with support, validation, encouragement, and a safe space to share her thoughts and feelings.

## 2024-08-07 NOTE — BH PSYCHOLOGY - CLINICIAN PSYCHOTHERAPY NOTE - NSBHPSYCHOLINT_PSY_A_CORE
Cognitive/behavioral therapy/Supported coping skills/Supportive therapy/Treatment compliance encouraged/other...

## 2024-08-07 NOTE — BH INPATIENT PSYCHIATRY PROGRESS NOTE - NSBHASSESSSUMMFT_PSY_ALL_CORE
33 y/o F with bipolar disorder not on medications; , domiciled with , no dependents, employed as ; recent graduated with master's in education; no pmhx; pphx of 2 prior psych hospitalizations ([facility] 2020, [facility] 2010); no hx of SA; hx of substance use (shrooms, thc); no hx of aggression BIBEMS for agitation and verbal aggression.     Patient with improving sxs of rach; had a full night's sleep, speech has become less pressured, TP more linear; pt less labile on interview. Will keep Risperdal steady at 3 for now.  Continued inpatient admission required for stabilization, medication optimization, safe discharge planning.    Plan:  1. Legal: Admitted on/continue 9.39 status  2. Safety: No reported SI/SIB/HI/VI currently on unit; continue routine observation.  - Haldol 5/Ativan 2/Benadryl 50 PRN medications for safety/agitation  3. Psychiatric:  - c/w Risperdal 3 qHS. Can consider conversion to DE LA GARZA once tolerability established  - c/w Klonopin 0.5 qHS  4. Therapy: individual, group & milieu therapy  5. Medical: no evidence of UTI from UA  6. Collateral: Collateral from ; see  chart note  7. Disposition: When stable/pending clinical improvement   33 y/o F with bipolar disorder not on medications; , domiciled with , no dependents, employed as ; recent graduated with master's in education; no pmhx; pphx of 2 prior psych hospitalizations ([facility] 2020, [facility] 2010); no hx of SA; hx of substance use (shrooms, thc); no hx of aggression BIBEMS for agitation and verbal aggression.     Patient with improving sxs of rach; had a full night's sleep, speech has become less pressured, TP more linear; pt less labile on interview. Will keep Risperdal steady at 3 for now.  Continued inpatient admission required for stabilization, medication optimization, safe discharge planning.    Plan:  1. Legal: Admitted on/continue 9.39 status  2. Safety: No reported SI/SIB/HI/VI currently on unit; continue routine observation.  - Haldol 5/Ativan 2/Benadryl 50 PRN medications for safety/agitation  3. Psychiatric:  - c/w Risperdal 3 qHS. Can consider conversion to DE LA GARZA once tolerability established  - c/w Klonopin 0.5 qHS  4. Therapy: individual, group & milieu therapy  5. Medical:  - Benadryl 25 and hydrocortisone 1% cream for rash; will continue to monitor   6. Collateral: Collateral from ; see  chart note  7. Disposition: When stable/pending clinical improvement

## 2024-08-07 NOTE — BH INPATIENT PSYCHIATRY PROGRESS NOTE - CURRENT MEDICATION
MEDICATIONS  (STANDING):  clonazePAM  Tablet 0.5 milliGRAM(s) Oral at bedtime  hydrocortisone 1% Cream 1 Application(s) Topical daily  risperiDONE   Tablet 3 milliGRAM(s) Oral at bedtime    MEDICATIONS  (PRN):  diphenhydrAMINE 50 milliGRAM(s) Oral every 6 hours PRN Extrapyramidal prophylaxis  diphenhydrAMINE Injectable 50 milliGRAM(s) IntraMuscular once PRN agitation  haloperidol     Tablet 5 milliGRAM(s) Oral every 6 hours PRN agitation  haloperidol    Injectable 5 milliGRAM(s) IntraMuscular once PRN agitation  LORazepam     Tablet 2 milliGRAM(s) Oral every 6 hours PRN Agitation  LORazepam   Injectable 2 milliGRAM(s) IntraMuscular once PRN agitation

## 2024-08-07 NOTE — BH INPATIENT PSYCHIATRY PROGRESS NOTE - NSBHCHARTREVIEWINVESTIGATE_PSY_A_CORE FT
< from: 12 Lead ECG (08.03.24 @ 23:47) >    Ventricular Rate 78 BPM    Atrial Rate 78 BPM    P-R Interval 136 ms    QRS Duration 84 ms    Q-T Interval 390 ms    QTC Calculation(Bazett) 444 ms    P Axis 22 degrees    R Axis 43 degrees    T Axis 13 degrees    Diagnosis Line Normal sinus rhythm  Normal ECG    < end of copied text >

## 2024-08-08 PROCEDURE — 99232 SBSQ HOSP IP/OBS MODERATE 35: CPT | Mod: GC

## 2024-08-08 RX ORDER — DIPHENHYDRAMINE HCL 25 MG
25 CAPSULE ORAL ONCE
Refills: 0 | Status: DISCONTINUED | OUTPATIENT
Start: 2024-08-08 | End: 2024-08-09

## 2024-08-08 RX ORDER — HYDROCORTISONE 1 %
1 CREAM (GRAM) TOPICAL
Qty: 1 | Refills: 0
Start: 2024-08-08 | End: 2024-08-21

## 2024-08-08 RX ORDER — RISPERIDONE 1 MG/1
1 TABLET, FILM COATED ORAL
Qty: 14 | Refills: 0
Start: 2024-08-08 | End: 2024-08-21

## 2024-08-08 RX ORDER — CLONAZEPAM 0.5 MG/1
1 TABLET ORAL
Qty: 14 | Refills: 0
Start: 2024-08-08 | End: 2024-08-21

## 2024-08-08 RX ADMIN — RISPERIDONE 3 MILLIGRAM(S): 1 TABLET, FILM COATED ORAL at 20:49

## 2024-08-08 RX ADMIN — Medication 1 APPLICATION(S): at 09:08

## 2024-08-08 RX ADMIN — CLONAZEPAM 0.5 MILLIGRAM(S): 0.5 TABLET ORAL at 20:49

## 2024-08-08 NOTE — BH TREATMENT PLAN - NSBHPRIMARYDX_PSY_ALL_CORE
Bipolar disorder, current episode manic, severe    
Bipolar disorder, current episode manic, severe

## 2024-08-08 NOTE — BH INPATIENT PSYCHIATRY PROGRESS NOTE - NSICDXBHTERTIARYDX_PSY_ALL_CORE
R/O Substance induced mood disorder   F19.94  

## 2024-08-08 NOTE — BH TREATMENT PLAN - NSTXMEDICINTERRN_PSY_ALL_CORE
RN will administermedication as prescribed and provide medication education
RN will administermedication as prescribed and provide medication education

## 2024-08-08 NOTE — BH INPATIENT PSYCHIATRY PROGRESS NOTE - NSDCCRITERIA_PSY_ALL_CORE
when patient is no longer at an acutely elevated risk of harm to self or others

## 2024-08-08 NOTE — BH TREATMENT PLAN - NSTXPROBSUBMIS_PSY_ALL_CORE
-- DO NOT REPLY / DO NOT REPLY ALL --  -- This inbox is not monitored  -- Message is from Engagement Center Operations (ECO) --      Message Type:  Refill Medication   Refill request for Pended medication named: amLODIPine (NORVASC) 5 MG tablet   Preferred pharmacy verified, and selected.   [id number] - [place], [place] - [address] [facility]    Is the patient OUT of Medication?  No        Message: please let patient know when medication has been sent to pharmacy                   
Spoke with pharmacy in regards to amLODIPine (NORVASC) 5 MG tablet pickup status. Medication should be ready for pickup tomorrow.  
SUBSTANCE MISUSE
SUBSTANCE MISUSE

## 2024-08-08 NOTE — BH INPATIENT PSYCHIATRY PROGRESS NOTE - NSBHASSESSSUMMFT_PSY_ALL_CORE
35 y/o F with bipolar disorder not on medications; , domiciled with , no dependents, employed as ; recent graduated with master's in education; no pmhx; pphx of 2 prior psych hospitalizations ([facility] 2020, [facility] 2010); no hx of SA; hx of substance use (shrooms, thc); no hx of aggression BIBEMS for agitation and verbal aggression.     Pt continues to have improving sxs of rach. Sxs likely substance induced superimposed on underlying mood disorder. Consistently denying SI/HI and no acute safety concerns at home per . Plan for discharge tomorrow.     Plan:  1. Legal: Admitted on/continue 9.39 status  2. Safety: No reported SI/SIB/HI/VI currently on unit; continue routine observation.  - Haldol 5/Ativan 2/Benadryl 50 PRN medications for safety/agitation  3. Psychiatric:  - c/w Risperdal 3 qHS.  - c/w Klonopin 0.5 qHS  4. Therapy: individual, group & milieu therapy  5. Medical:  - Benadryl 25 and hydrocortisone 1% cream for rash; will continue to monitor   6. Collateral: Collateral from ; see  chart note  7. Disposition: When stable/pending clinical improvement

## 2024-08-08 NOTE — BH INPATIENT PSYCHIATRY PROGRESS NOTE - NSBHATTESTBILLING_PSY_A_CORE
35141-Gcwqainsbf OBS or IP - moderate complexity OR 35-49 mins
80882-Psndwyciut OBS or IP - moderate complexity OR 35-49 mins
75001-Mlmzivnxdj OBS or IP - moderate complexity OR 35-49 mins

## 2024-08-08 NOTE — BH DISCHARGE NOTE NURSING/SOCIAL WORK/PSYCH REHAB - NSCDUDCCRISIS_PSY_A_CORE
Counts include 234 beds at the [facility]  1 [phone]) Counts include 234 beds at the [facility]-WELL ([phone])  Text "WELL" to [phone]  Website: www.[place].[employer]/.National Suicide Prevention Lifeline 2 (686) 546-1929/.  [employer]  1 (411) LIFENET (623-7076)/.  [facility]  [address] [address] [phone]   Hours:  Monday through Friday from 9 AM to 3 PM/988 Suicide and Crisis Lifeline

## 2024-08-08 NOTE — BH INPATIENT PSYCHIATRY DISCHARGE NOTE - NSBHMETABOLIC_PSY_ALL_CORE_FT
BMI:   HbA1c: A1C with Estimated Average Glucose Result: 4.9 % (08-06-24 @ 08:20)    Glucose:   BP: 129/79 (08-06-24 @ 10:30) (129/79 - 129/79)Vital Signs Last 24 Hrs  T(C): 36.5 (08-08-24 @ 07:47), Max: 36.8 (08-07-24 @ 20:18)  T(F): 97.7 (08-08-24 @ 07:47), Max: 98.3 (08-07-24 @ 20:18)  HR: --  BP: --  BP(mean): --  RR: 20 (08-07-24 @ 20:18) (20 - 20)  SpO2: --    Orthostatic VS  08-08-24 @ 07:47  Lying BP: --/-- HR: --  Sitting BP: 130/81 HR: 105  Standing BP: 124/78 HR: 104  Site: --  Mode: --  Orthostatic VS  08-07-24 @ 20:18  Lying BP: --/-- HR: --  Sitting BP: 127/79 HR: 97  Standing BP: 132/89 HR: 91  Site: --  Mode: --  Orthostatic VS  08-07-24 @ 08:11  Lying BP: --/-- HR: --  Sitting BP: 115/90 HR: 118  Standing BP: 126/89 HR: 118  Site: --  Mode: --  Orthostatic VS  08-06-24 @ 20:17  Lying BP: --/-- HR: --  Sitting BP: 133/76 HR: 94  Standing BP: 118/67 HR: 98  Site: --  Mode: --    Lipid Panel: Date/Time: 08-05-24 @ 08:32  Cholesterol, Serum: 220  LDL Cholesterol Calculated: 132  HDL Cholesterol, Serum: 69  Total Cholesterol/HDL Ration Measurement: --  Triglycerides, Serum: 95

## 2024-08-08 NOTE — BH INPATIENT PSYCHIATRY DISCHARGE NOTE - REASON FOR ADMISSION
You were admitted on an involuntary emergency status because you were displaying symptoms of rach, which interfered with your ability to keep yourself safe.

## 2024-08-08 NOTE — BH INPATIENT PSYCHIATRY DISCHARGE NOTE - NSDCCPCAREPLAN_GEN_ALL_CORE_FT
PRINCIPAL DISCHARGE DIAGNOSIS  Diagnosis: Bipolar disorder  Assessment and Plan of Treatment:       SECONDARY DISCHARGE DIAGNOSES  Diagnosis: Substance induced mood disorder  Assessment and Plan of Treatment:      PRINCIPAL DISCHARGE DIAGNOSIS  Diagnosis: Bipolar disorder  Assessment and Plan of Treatment: continue Risperdal 3 and Klonopin 0.5 nightly      SECONDARY DISCHARGE DIAGNOSES  Diagnosis: Substance induced mood disorder  Assessment and Plan of Treatment: limit / discontinue substance use

## 2024-08-08 NOTE — BH TREATMENT PLAN - NSTXMANICINTERRN_PSY_ALL_CORE
RN will assist patient in identifying 2 early signs of rach
RN will assist patient in identifying 2 early signs of rach

## 2024-08-08 NOTE — BH DISCHARGE NOTE NURSING/SOCIAL WORK/PSYCH REHAB - NSDCPRRECOMMEND_PSY_ALL_CORE
Upon discharge, it is recommended that patient continue to attend a structured and supportive intervention to sustain noted improvements.

## 2024-08-08 NOTE — BH TREATMENT PLAN - NSTXMANICGOAL_PSY_ALL_CORE
Be able to identify the early signs of rach (e.g. sleep and mood changes) and to employ coping strategies to minimize acting out
Be able to identify the early signs of rach (e.g. sleep and mood changes) and to employ coping strategies to minimize acting out

## 2024-08-08 NOTE — BH INPATIENT PSYCHIATRY PROGRESS NOTE - NSBHATTESTCOMMENTATTENDFT_PSY_A_CORE
calm, cooperative, animated, somewhat superfluous, sleeping better, adherent to medications and tolerating without issues. reflects on need for ongoing medications. collateral indicates pt improved / closer to baseline. planned dc tomorrow 
Sleeping better, tolerating increased Ripserdal, able to reflect it’s helpful. She appears well rested and more euphoric today, not pressured, no FOI. Will maintain Risperdal at current dose 
Tearful, somewhat irritable, discharge focused. Denies avh. sleep is fair. Increase Risperdal to 3mg qhs to better target mood.

## 2024-08-08 NOTE — BH INPATIENT PSYCHIATRY DISCHARGE NOTE - NSDCMRMEDTOKEN_GEN_ALL_CORE_FT
clonazePAM 0.5 mg oral tablet: 1 tab(s) orally once a day (at bedtime) MDD: 0.5 mg  hydrocortisone 1% topical cream: Apply topically to affected area once a day as needed for  itching  risperiDONE 3 mg oral tablet: 1 tab(s) orally once a day (at bedtime) MDD: 3 mg per day

## 2024-08-08 NOTE — BH DISCHARGE NOTE NURSING/SOCIAL WORK/PSYCH REHAB - PATIENT PORTAL LINK FT
You can access the FollowMyHealth Patient Portal offered by [facility] by registering at the following website: [email or web address]. By joining [facility]’s FollowMyHealth portal, you will also be able to view your health information using other applications (apps) compatible with our system.

## 2024-08-08 NOTE — BH DISCHARGE NOTE NURSING/SOCIAL WORK/PSYCH REHAB - NSDCPRGOAL_PSY_ALL_CORE
Patient met specified goal of being able to identify the early signs of rach (e.g. sleep and mood changes) and to employ coping strategies to minimize acting out.  Progress was evidenced by patient demonstrating improved insight into symptoms of rach, what triggers her episodes and being in behavioral control while on the unit. Patient did not attend any psychiatric rehabilitation groups during admission but was engaged in individual sessions. Patient and staff collaborated on safety planning prior to discharge.

## 2024-08-08 NOTE — BH DISCHARGE NOTE NURSING/SOCIAL WORK/PSYCH REHAB - NSDCADDINFO2FT_PSY_ALL_CORE
This is your aftercare appointment with [facility]. Please bring your license and insurance card and arrive early for registration.

## 2024-08-08 NOTE — BH INPATIENT PSYCHIATRY DISCHARGE NOTE - NSBHASSESSSUMMFT_PSY_ALL_CORE
33 y/o F with bipolar disorder not on medications; , domiciled with , no dependents, employed as ; recent graduated with master's in education; no pmhx; pphx of 2 prior psych hospitalizations ([facility] 2020, [facility] 2010); no hx of SA; hx of substance use (shrooms, thc); no hx of aggression BIBEMS for agitation and verbal aggression.       Today,  * (one sentence assessment of symptoms present on day of discharge)       Patient is therefore stable for discharge and transition to outpatient level of care.       Plan:  1. Patient to be discharged to home today with friend. Patient and family in agreement with discharge plan.   2. Continue psychiatric medications as follows: Risperdal 3 qHS, Klonopin 0.5 qHS. 14 day supply of medication provided.    3. Aftercare plan: follow-up appt with [facility] 09/17/2024 0900 with [facility] at [facility] 08/15/2024 1130  4. Medical: c/w hydrocortisone 1% cream and Benadryl 25 mg q4h PRN for contact dermatitis    5. Safety plan reviewed and emergency procedures discussed including crisis clinic, ED and use of 911 for acute safety concerns. Patient and family aware of how to contact 1S team.     35 y/o F with bipolar disorder not on medications; , domiciled with , no dependents, employed as ; recent graduated with master's in education; no pmhx; pphx of 2 prior psych hospitalizations ([facility] 2020, [facility] 2010); no hx of SA; hx of substance use (shrooms, thc); no hx of aggression BIBEMS for agitation and verbal aggression.     Today, patient continues to show improving signs of rach. Discharge focused with plans for how to spend the day and next few months; no acute safety concerns at home.   Pt has chronic risk factors of harm to self and others including hx of substance use (THC and psilocybin), previous psychiatric inpatient hospitalizations, and history of medication non-adherence. These are mitigated by protective factors of being domiciled and , having a support system, being employed, being future oriented, having pets, financial stability, no access to firearms, and having a strong therapeutic alliance with her therapist. On admission, patient was at an acutely elevated risk of harm to self and others given that she was not sleeping, exhibiting symptoms of rach, smoking marijuana daily; had elevated psychosocial stressors; was not on psychotropic medication, was verbally aggressive with threats of physical aggression, and was agitated and not able to be deescalated. During hospitalization, patient was started on several medications that improved sleep and mood stabilization. Pt was further provided with therapy and coping tools for frustration tolerance. Patient has also been connected to appropriate outpatient follow-up care. Throughout hospitalization, patient has consistently denied SIIP and HIIP and displayed an improvement in presenting sxs. At time of discharge, patient is no longer at an acutely elevated risk of harm to self or others and is suitable for discharge and outpatient follow-up.    Plan:  1. Patient to be discharged to home today with . Patient and family in agreement with discharge plan.   2. Continue psychiatric medications as follows: Risperdal 3 qHS, Klonopin 0.5 qHS. 14 day supply of medication provided.    3. Aftercare plan: follow-up appt with [facility] 09/17/2024 0900 with bridge [facility] 08/15/2024 1130  4. Medical: c/w hydrocortisone 1% cream and Benadryl 25 mg q4h PRN for contact dermatitis    5. Safety plan reviewed and emergency procedures discussed including crisis clinic, ED and use of 911 for acute safety concerns. Patient and family aware of how to contact 2N team.
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
## 2024-08-08 NOTE — BH INPATIENT PSYCHIATRY PROGRESS NOTE - NSICDXBHPRIMARYDX_PSY_ALL_CORE
Bipolar disorder, current episode manic, severe   F31.13  

## 2024-08-08 NOTE — BH INPATIENT PSYCHIATRY DISCHARGE NOTE - DESCRIPTION
Recently  in [facility].  Social studies .  Recently obtained masters in education.  Identifies as [place].

## 2024-08-08 NOTE — BH INPATIENT PSYCHIATRY DISCHARGE NOTE - NSBHFUPINTERVALHXFT_PSY_A_CORE
Chart reviewed. Case discussed with interdisciplinary team. Patient seen and examined. No acute events overnight. Adherent to standing medications. Per sleep log. 6 hours of sleep.    Today, patient endorsing that she is feeling excited about going home. Excited to finally be with her  and put on her wedding ring again. Going to go home and take a thorough shower with her personal hygiene products. Plans to watch scary movies with . Thanks treatment team for taking care of her this week and reports that she will continue with medications. No SI or HI at time of interview.

## 2024-08-08 NOTE — BH INPATIENT PSYCHIATRY DISCHARGE NOTE - ATTENDING DISCHARGE PHYSICAL EXAMINATION:
calm, cooperative, sleeping better, adherent to medications, improving rach, rash improving likely related to soap she used and improved on benadryl + hydrocortisone. pt's thoughts are better organized, she is goal-directed, bright affect with appropriate range, no SI and HI, no psychotic contents and pt with developing insight + judgment. Presentation consistent with exacerbation of bipolar disorder triggered by substance use and psychosocial stressors now attenuated. no acute safety concerns and pt stable for ongoing care in setting less restrictive

## 2024-08-08 NOTE — BH INPATIENT PSYCHIATRY DISCHARGE NOTE - NSDCRECOMMENDMEDICALFT_PSY_ALL_CORE
follow up with you PCP regarding rash. go to nearest emergency room or call 911 incase of psychiatric or medical emergency

## 2024-08-08 NOTE — BH INPATIENT PSYCHIATRY DISCHARGE NOTE - NSBHDCHANDOFFFT_PSY_ALL_CORE
charge rn: pt back from [place]. pt to room 13. jemma handoff provided to [facility] and [facility]  handoff provided to [facility]

## 2024-08-08 NOTE — BH INPATIENT PSYCHIATRY DISCHARGE NOTE - MSE UNSTRUCTURED FT
POOJA/BEH: Adult female in no acute distress; dressed in street clothes; cooperative; fair eye contact.  SPEECH: overproductive  MOTOR: No PMR/PMA; no other abnormal movements.  MOOD: “great"  AFFECT: euthymic; mood congruent  THOUGHT PROCESS: circumstantial  THOUGHT CONTENT: Denies SI or HI; no evidence of delusions, no AVH  COGNITION: Grossly intact.  INSIGHT: improving  JUDGMENT: improving  IMPULSE CONTROL: Intact on unit.   GAIT: Intact   POOJA/BEH: Adult female in no acute distress; dressed in street clothes; cooperative; fair eye contact.  SPEECH: overproductive  MOTOR: No PMR/PMA; no other abnormal movements.  MOOD: “great"  AFFECT: euthymic; mood congruent  THOUGHT PROCESS: less circular   THOUGHT CONTENT: Denies SI or HI; no evidence of delusions, no AVH  COGNITION: Grossly intact.  INSIGHT: improving  JUDGMENT: improving  IMPULSE CONTROL: Intact on unit.   GAIT: Intact

## 2024-08-08 NOTE — BH INPATIENT PSYCHIATRY PROGRESS NOTE - NSTXSLPPATGOAL_PSY_ALL_CORE
Utilize relaxation techniques to aid in sleeping

## 2024-08-08 NOTE — BH INPATIENT PSYCHIATRY PROGRESS NOTE - NSBHCONSBHPROVDETAILS_PSY_A_CORE  FT
Repeated attempts to contact Luis Carlos Valenzuela w/o success
Repeated attempts to contact Luis Carlos Valenzuela w/o success

## 2024-08-08 NOTE — BH INPATIENT PSYCHIATRY PROGRESS NOTE - NSBHCHARTREVIEWVS_PSY_A_CORE FT
Vital Signs Last 24 Hrs  T(C): 36.5 (08-08-24 @ 07:47), Max: 36.8 (08-07-24 @ 20:18)  T(F): 97.7 (08-08-24 @ 07:47), Max: 98.3 (08-07-24 @ 20:18)  HR: --  BP: --  BP(mean): --  RR: 20 (08-07-24 @ 20:18) (20 - 20)  SpO2: --    Orthostatic VS  08-08-24 @ 07:47  Lying BP: --/-- HR: --  Sitting BP: 130/81 HR: 105  Standing BP: 124/78 HR: 104  Site: --  Mode: --  Orthostatic VS  08-07-24 @ 20:18  Lying BP: --/-- HR: --  Sitting BP: 127/79 HR: 97  Standing BP: 132/89 HR: 91  Site: --  Mode: --  Orthostatic VS  08-07-24 @ 08:11  Lying BP: --/-- HR: --  Sitting BP: 115/90 HR: 118  Standing BP: 126/89 HR: 118  Site: --  Mode: --  Orthostatic VS  08-06-24 @ 20:17  Lying BP: --/-- HR: --  Sitting BP: 133/76 HR: 94  Standing BP: 118/67 HR: 98  Site: --  Mode: --  
Vital Signs Last 24 Hrs  T(C): 36.2 (08-07-24 @ 08:11), Max: 36.2 (08-07-24 @ 08:11)  T(F): 97.1 (08-07-24 @ 08:11), Max: 97.1 (08-07-24 @ 08:11)  HR: --  BP: --  BP(mean): --  RR: --  SpO2: 100% (08-06-24 @ 20:17) (100% - 100%)    Orthostatic VS  08-07-24 @ 08:11  Lying BP: --/-- HR: --  Sitting BP: 115/90 HR: 118  Standing BP: 126/89 HR: 118  Site: --  Mode: --  Orthostatic VS  08-06-24 @ 20:17  Lying BP: --/-- HR: --  Sitting BP: 133/76 HR: 94  Standing BP: 118/67 HR: 98  Site: --  Mode: --  Orthostatic VS  08-06-24 @ 08:10  Lying BP: --/-- HR: --  Sitting BP: 135/91 HR: 89  Standing BP: 126/92 HR: 109  Site: --  Mode: --  Orthostatic VS  08-05-24 @ 20:43  Lying BP: --/-- HR: --  Sitting BP: 136/79 HR: 98  Standing BP: 121/79 HR: 116  Site: --  Mode: --  
Vital Signs Last 24 Hrs  T(C): 36.1 (08-06-24 @ 08:10), Max: 36.9 (08-05-24 @ 20:43)  T(F): 97 (08-06-24 @ 08:10), Max: 98.4 (08-05-24 @ 20:43)  HR: --  BP: --  BP(mean): --  RR: --  SpO2: --    Orthostatic VS  08-06-24 @ 08:10  Lying BP: --/-- HR: --  Sitting BP: 135/91 HR: 89  Standing BP: 126/92 HR: 109  Site: --  Mode: --  Orthostatic VS  08-05-24 @ 20:43  Lying BP: --/-- HR: --  Sitting BP: 136/79 HR: 98  Standing BP: 121/79 HR: 116  Site: --  Mode: --  Orthostatic VS  08-05-24 @ 07:46  Lying BP: --/-- HR: --  Sitting BP: 134/84 HR: 107  Standing BP: 129/85 HR: 118  Site: --  Mode: --
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
## 2024-08-08 NOTE — BH INPATIENT PSYCHIATRY DISCHARGE NOTE - HOSPITAL COURSE
Dates of Hospitalization: 08/04/24 - 08/09/24     On admission interview, patient presented with the following signs and symptoms: psychomotor agitation, repetitive goal directed activity, talkativeness, loose associations, poor sleep, grandiosity, hostility, and irritability. Pt was subsequently admitted under 9.39 emergency to .     During hospitalization, patient was started on Risperdal which was titrated to a dose of 3 qHS with good effect and tolerability.  Pt was also started on Klonopin, which was titrated to a dose of 0.5 qHS with good effect and tolerability.     Patient’s symptoms gradually improved over the course of the hospitalization. Patient's symptoms of rach attenuated, as her sleep consistently improved, speech became less pressured, thought process became more linear, and lability improved.     There were no behavioral problems on the unit.  Patient did not become agitated and did not require emergent intramuscular medications or seclusion / restraints.  Patient did not self-harm on the unit.  Patient remained actively engaged in treatment.  Patient participated in group and milieu therapy.  atient got along appropriately with staff and peers.  Family was contacted at time of admission to obtain collateral, provide psycho-education and to collaboratively treatment plan. Family was contacted prior to discharge for safety planning and disposition planning.    On day of discharge, the patient has improved significantly and no longer requires inpatient treatment and care. Patient denies all suicidal and aggressive ideation, intent and plan. Patient denies anxiety symptoms and panic attacks. Patient is not judged to be an acute danger to self or others at this time.    Risk Assessment:  Pt has chronic risk factors of harm to self and others including hx of substance use (THC and psilocybin), previous psychiatric inpatient hospitalizations, and history of medication non-adherence. These are mitigated by protective factors of being domiciled and , having a support system, being employed, being future oriented, having pets, financial stability, no access to firearms, and a good therapeutic alliance with her therapist. On admission, patient was at an acutely elevated risk of harm to self and others given that she was not sleeping, exhibiting symptoms of rach, not on psychotropic medication, was verbally aggressive with threats of physical aggression, and was agitated and not able to be deescalated. During hospitalization, patient was started on several medications that helped with sleep and mood stabilization. She was further provided with therapy and tools for frustration tolerance. Patient has also been connected to appropriate outpatient follow-up care. Throughout hospitalization, patient has consistently denied SIIP and HIIP and displayed an improvement in presenting sxs. At time of discharge, patient is no longer felt to be at an acutely elevated risk of harm to self or others and is suitable for discharge.     Patient will be discharged with the following DSM5 Diagnoses:   1) Bipolar 1 Disorder  2) Substance induced mood disorder     Patient will be discharged on the following medications:  Risperdal 3 qHS  Klonopin 0.5 qHS  Hydrocortisone 1% cream as needed for rash Dates of Hospitalization: 08/04/24 - 08/09/24     On admission interview, patient presented with the following signs and symptoms: psychomotor agitation, repetitive goal directed activity, talkativeness, loose associations, poor sleep, grandiosity, hostility, and irritability. Pt was subsequently admitted under 9.39 emergency to .     During hospitalization, patient was started on Risperdal which was titrated to a dose of 3 qHS with good effect and tolerability.  Pt was also started on Klonopin, which was titrated to a dose of 0.5 qHS with good effect and tolerability.     Patient’s symptoms gradually improved over the course of the hospitalization. Patient's symptoms of rach attenuated, as her sleep consistently improved, speech became less pressured, thought process became more linear, and lability improved.     There were no behavioral problems on the unit.  Patient did not become agitated and did not require emergent intramuscular medications or seclusion / restraints.  Patient did not self-harm on the unit.  Patient remained actively engaged in treatment.  Patient participated in group and milieu therapy.  atient got along appropriately with staff and peers.  Family was contacted at time of admission to obtain collateral, provide psycho-education and to collaboratively treatment plan. Family was contacted prior to discharge for safety planning and disposition planning.    On day of discharge, the patient has improved significantly and no longer requires inpatient treatment and care. Patient denies all suicidal and aggressive ideation, intent and plan. Patient denies anxiety symptoms and panic attacks. Patient is not judged to be an acute danger to self or others at this time.    Risk Assessment:  Pt has chronic risk factors of harm to self and others including hx of substance use (THC and psilocybin), previous psychiatric inpatient hospitalizations, and history of medication non-adherence. These are mitigated by protective factors of being domiciled and , having a support system, being employed, being future oriented, having pets, financial stability, no access to firearms, and a good therapeutic alliance with her therapist. On admission, patient was at an acutely elevated risk of harm to self and others given that she was not sleeping, exhibiting symptoms of rach, smoking marijuana daily; had elevated psychosocial stressors; not on psychotropic medication, was verbally aggressive with threats of physical aggression, and was agitated and not able to be deescalated. During hospitalization, patient was started on several medications that helped improve sleep and mood stabilization. She was further provided with therapy and coping tools for frustration tolerance. Patient has also been connected to appropriate outpatient follow-up care. Throughout hospitalization, patient has consistently denied SIIP and HIIP and displayed an improvement in presenting sxs. At time of discharge, patient is no longer felt to be at an acutely elevated risk of harm to self or others and is suitable for discharge.     Patient will be discharged with the following DSM5 Diagnoses:   1) Bipolar 1 Disorder  2) Substance induced mood disorder     Patient will be discharged on the following medications:  Risperdal 3 qHS  Klonopin 0.5 qHS  Hydrocortisone 1% cream as needed for rash Dates of Hospitalization: 08/04/24 - 08/09/24     On admission interview, patient presented with the following signs and symptoms: psychomotor agitation, repetitive goal directed activity, talkativeness, loose associations, poor sleep, grandiosity, hostility, and irritability. Pt was subsequently admitted under 9.39 emergency to .     During hospitalization, patient was started on Risperdal which was titrated to a dose of 3 qHS with good effect and tolerability.  Pt was also started on Klonopin, which was titrated to a dose of 0.5 qHS with good effect and tolerability.     Patient’s symptoms gradually improved over the course of the hospitalization. Patient's symptoms of rach attenuated, as her sleep consistently improved, speech became less pressured, thought process became more linear, and lability improved.     There were no behavioral problems on the unit.  Patient did not become agitated and did not require emergent intramuscular medications or seclusion / restraints.  Patient did not self-harm on the unit.  Patient remained actively engaged in treatment.  Patient participated in group and milieu therapy.  atient got along appropriately with staff and peers.  Family was contacted at time of admission to obtain collateral, provide psycho-education and to collaboratively treatment plan. Family was contacted prior to discharge for safety planning and disposition planning.    On day of discharge, the patient has improved significantly and no longer requires inpatient treatment and care. Patient denies all suicidal and aggressive ideation, intent and plan. Patient denies anxiety symptoms and panic attacks. Patient is not judged to be an acute danger to self or others at this time.    Risk Assessment:  Pt has chronic risk factors of harm to self and others including hx of substance use (THC and psilocybin), previous psychiatric inpatient hospitalizations, and history of medication non-adherence. These are mitigated by protective factors of being domiciled and , having a support system, being employed, being future oriented, having pets, financial stability, no access to firearms, and having a strong therapeutic alliance with her therapist. On admission, patient was at an acutely elevated risk of harm to self and others given that she was not sleeping, exhibiting symptoms of rach, smoking marijuana daily; had elevated psychosocial stressors; was not on psychotropic medication, was verbally aggressive with threats of physical aggression, and was agitated and not able to be deescalated. During hospitalization, patient was started on several medications that improved sleep and mood stabilization. Pt was further provided with therapy and coping tools for frustration tolerance. Patient has also been connected to appropriate outpatient follow-up care. Throughout hospitalization, patient has consistently denied SIIP and HIIP and displayed an improvement in presenting sxs. At time of discharge, patient is no longer at an acutely elevated risk of harm to self or others and is suitable for discharge and outpatient follow-up.    Patient will be discharged with the following DSM5 Diagnoses:   1) Bipolar 1 Disorder  2) Substance induced mood disorder     Patient will be discharged on the following medications:  Risperdal 3 qHS  Klonopin 0.5 qHS  Hydrocortisone 1% cream as needed for rash

## 2024-08-08 NOTE — BH INPATIENT PSYCHIATRY DISCHARGE NOTE - VIOLENCE RISK FACTORS:
Violent ideation/threat/speech/Substance abuse/Lack of insight into violence risk/need for treatment/Community stressors that increase the risk of destabilization/Irritability

## 2024-08-08 NOTE — BH INPATIENT PSYCHIATRY PROGRESS NOTE - CURRENT MEDICATION
MEDICATIONS  (STANDING):  clonazePAM  Tablet 0.5 milliGRAM(s) Oral at bedtime  hydrocortisone 1% Cream 1 Application(s) Topical daily  risperiDONE   Tablet 3 milliGRAM(s) Oral at bedtime    MEDICATIONS  (PRN):  diphenhydrAMINE 50 milliGRAM(s) Oral every 6 hours PRN Extrapyramidal prophylaxis  diphenhydrAMINE Injectable 50 milliGRAM(s) IntraMuscular once PRN agitation  haloperidol     Tablet 5 milliGRAM(s) Oral every 6 hours PRN agitation  haloperidol    Injectable 5 milliGRAM(s) IntraMuscular once PRN agitation  LORazepam     Tablet 2 milliGRAM(s) Oral every 6 hours PRN Agitation  LORazepam   Injectable 2 milliGRAM(s) IntraMuscular once PRN agitation   MEDICATIONS  (STANDING):  clonazePAM  Tablet 0.5 milliGRAM(s) Oral at bedtime  diphenhydrAMINE 25 milliGRAM(s) Oral once  hydrocortisone 1% Cream 1 Application(s) Topical daily  risperiDONE   Tablet 3 milliGRAM(s) Oral at bedtime    MEDICATIONS  (PRN):  diphenhydrAMINE 50 milliGRAM(s) Oral every 6 hours PRN Extrapyramidal prophylaxis  diphenhydrAMINE Injectable 50 milliGRAM(s) IntraMuscular once PRN agitation  haloperidol     Tablet 5 milliGRAM(s) Oral every 6 hours PRN agitation  haloperidol    Injectable 5 milliGRAM(s) IntraMuscular once PRN agitation  LORazepam     Tablet 2 milliGRAM(s) Oral every 6 hours PRN Agitation  LORazepam   Injectable 2 milliGRAM(s) IntraMuscular once PRN agitation

## 2024-08-08 NOTE — BH TREATMENT PLAN - NSTXDISORGINTERRN_PSY_ALL_CORE
RN will assist patient in identifying coping skills to assist in organizing
RN will assist patient in identifying coping skills to assist in organizing

## 2024-08-08 NOTE — BH DISCHARGE NOTE NURSING/SOCIAL WORK/PSYCH REHAB - NSDCADDINFO1FT_PSY_ALL_CORE
This is the appointment that links you to the Adult Outpatient Psychiatry Department. It is very important that you attend this appointment. Please bring your insurance and ID card for registration. You have received/will receive a text confirmation asking you to complete registration.

## 2024-08-08 NOTE — BH INPATIENT PSYCHIATRY PROGRESS NOTE - MSE UNSTRUCTURED FT
POOJA/BEH: Adult female in no acute distress; dressed in street clothes; guarded, hostile, irritable; fair eye contact.  SPEECH: overproductive  MOTOR: No PMR/PMA; no other abnormal movements.  MOOD: “upset that I have to be here"  AFFECT: labile; mood congruent  THOUGHT PROCESS: circumstantial  THOUGHT CONTENT: Denies SI or HI; no evidence of delusions, no AVH  COGNITION: Grossly intact.  INSIGHT: impaired  JUDGMENT: limited  IMPULSE CONTROL: Intact on unit.   GAIT: Intact
POOJA/BEH: Adult female in no acute distress; dressed in street clothes; guarded; fair eye contact.  SPEECH: normal rate, volume, tone  MOTOR: No PMR/PMA; no other abnormal movements.  MOOD: “good"  AFFECT: euthymic; mood congruent  THOUGHT PROCESS: circumstantial  THOUGHT CONTENT: Denies SI or HI; no evidence of delusions, no AVH  COGNITION: Grossly intact.  INSIGHT: impaired  JUDGMENT: limited  IMPULSE CONTROL: Intact on unit.   GAIT: Intact
POOJA/BEH: Adult female in no acute distress; dressed in street clothes; guarded; fair eye contact.  SPEECH: overproductive  MOTOR: No PMR/PMA; no other abnormal movements.  MOOD: “okay but I am still here"  AFFECT: euthymic; mood congruent  THOUGHT PROCESS: circumstantial  THOUGHT CONTENT: Denies SI or HI; no evidence of delusions, no AVH  COGNITION: Grossly intact.  INSIGHT: improving  JUDGMENT: improving  IMPULSE CONTROL: Intact on unit.   GAIT: Intact

## 2024-08-08 NOTE — BH TREATMENT PLAN - NSDCCRITERIA_PSY_ALL_CORE
when patient is no longer at an acutely elevated risk of harm to self or others
when patient is no longer at an acutely elevated risk of harm to self or others
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
## 2024-08-08 NOTE — BH TREATMENT PLAN - NSCMSPTSTRENGTHS_PSY_ALL_CORE
Assertive/Expressive of emotions/Marina/spirituality/Financial stability/Future/goal oriented/Intact employment/Intelligence/Leisure interest/Motivated/Physically healthy/Resourceful/Strong support system
Assertive/Expressive of emotions/Marina/spirituality/Financial stability/Future/goal oriented/Intact employment/Intelligence/Leisure interest/Motivated/Physically healthy/Resourceful/Strong support system

## 2024-08-08 NOTE — BH INPATIENT PSYCHIATRY PROGRESS NOTE - NSBHMETABOLIC_PSY_ALL_CORE_FT
BMI:   HbA1c: A1C with Estimated Average Glucose Result: 4.9 % (08-06-24 @ 08:20)    Glucose:   BP: 129/79 (08-06-24 @ 10:30) (129/79 - 129/79)Vital Signs Last 24 Hrs  T(C): 36.2 (08-07-24 @ 08:11), Max: 36.2 (08-07-24 @ 08:11)  T(F): 97.1 (08-07-24 @ 08:11), Max: 97.1 (08-07-24 @ 08:11)  HR: --  BP: --  BP(mean): --  RR: --  SpO2: 100% (08-06-24 @ 20:17) (100% - 100%)    Orthostatic VS  08-07-24 @ 08:11  Lying BP: --/-- HR: --  Sitting BP: 115/90 HR: 118  Standing BP: 126/89 HR: 118  Site: --  Mode: --  Orthostatic VS  08-06-24 @ 20:17  Lying BP: --/-- HR: --  Sitting BP: 133/76 HR: 94  Standing BP: 118/67 HR: 98  Site: --  Mode: --  Orthostatic VS  08-06-24 @ 08:10  Lying BP: --/-- HR: --  Sitting BP: 135/91 HR: 89  Standing BP: 126/92 HR: 109  Site: --  Mode: --  Orthostatic VS  08-05-24 @ 20:43  Lying BP: --/-- HR: --  Sitting BP: 136/79 HR: 98  Standing BP: 121/79 HR: 116  Site: --  Mode: --    Lipid Panel: Date/Time: 08-05-24 @ 08:32  Cholesterol, Serum: 220  LDL Cholesterol Calculated: 132  HDL Cholesterol, Serum: 69  Total Cholesterol/HDL Ration Measurement: --  Triglycerides, Serum: 95  
BMI:   HbA1c: A1C with Estimated Average Glucose Result: 4.9 % (08-06-24 @ 08:20)    Glucose:   BP: 129/79 (08-06-24 @ 10:30) (129/79 - 129/79)Vital Signs Last 24 Hrs  T(C): 36.5 (08-08-24 @ 07:47), Max: 36.8 (08-07-24 @ 20:18)  T(F): 97.7 (08-08-24 @ 07:47), Max: 98.3 (08-07-24 @ 20:18)  HR: --  BP: --  BP(mean): --  RR: 20 (08-07-24 @ 20:18) (20 - 20)  SpO2: --    Orthostatic VS  08-08-24 @ 07:47  Lying BP: --/-- HR: --  Sitting BP: 130/81 HR: 105  Standing BP: 124/78 HR: 104  Site: --  Mode: --  Orthostatic VS  08-07-24 @ 20:18  Lying BP: --/-- HR: --  Sitting BP: 127/79 HR: 97  Standing BP: 132/89 HR: 91  Site: --  Mode: --  Orthostatic VS  08-07-24 @ 08:11  Lying BP: --/-- HR: --  Sitting BP: 115/90 HR: 118  Standing BP: 126/89 HR: 118  Site: --  Mode: --  Orthostatic VS  08-06-24 @ 20:17  Lying BP: --/-- HR: --  Sitting BP: 133/76 HR: 94  Standing BP: 118/67 HR: 98  Site: --  Mode: --    Lipid Panel: Date/Time: 08-05-24 @ 08:32  Cholesterol, Serum: 220  LDL Cholesterol Calculated: 132  HDL Cholesterol, Serum: 69  Total Cholesterol/HDL Ration Measurement: --  Triglycerides, Serum: 95  
BMI:   HbA1c: A1C with Estimated Average Glucose Result: 4.9 % (08-06-24 @ 08:20)    Glucose:   BP: 160/82 (08-04-24 @ 09:58) (133/90 - 160/82)Vital Signs Last 24 Hrs  T(C): 36.1 (08-06-24 @ 08:10), Max: 36.9 (08-05-24 @ 20:43)  T(F): 97 (08-06-24 @ 08:10), Max: 98.4 (08-05-24 @ 20:43)  HR: --  BP: --  BP(mean): --  RR: --  SpO2: --    Orthostatic VS  08-06-24 @ 08:10  Lying BP: --/-- HR: --  Sitting BP: 135/91 HR: 89  Standing BP: 126/92 HR: 109  Site: --  Mode: --  Orthostatic VS  08-05-24 @ 20:43  Lying BP: --/-- HR: --  Sitting BP: 136/79 HR: 98  Standing BP: 121/79 HR: 116  Site: --  Mode: --  Orthostatic VS  08-05-24 @ 07:46  Lying BP: --/-- HR: --  Sitting BP: 134/84 HR: 107  Standing BP: 129/85 HR: 118  Site: --  Mode: --    Lipid Panel: Date/Time: 08-05-24 @ 08:32  Cholesterol, Serum: 220  LDL Cholesterol Calculated: 132  HDL Cholesterol, Serum: 69  Total Cholesterol/HDL Ration Measurement: --  Triglycerides, Serum: 95

## 2024-08-08 NOTE — BH DISCHARGE NOTE NURSING/SOCIAL WORK/PSYCH REHAB - DISCHARGE INSTRUCTIONS AFTERCARE APPOINTMENTS
In order to check the location, date, or time of your aftercare appointment, please refer to your Discharge Instructions Document given to you upon leaving the hospital.  If you have lost the instructions please call [phone]

## 2024-08-08 NOTE — BH INPATIENT PSYCHIATRY PROGRESS NOTE - PRN MEDS
MEDICATIONS  (PRN):  diphenhydrAMINE 50 milliGRAM(s) Oral every 6 hours PRN Extrapyramidal prophylaxis  diphenhydrAMINE Injectable 50 milliGRAM(s) IntraMuscular once PRN agitation  haloperidol     Tablet 5 milliGRAM(s) Oral every 6 hours PRN agitation  haloperidol    Injectable 5 milliGRAM(s) IntraMuscular once PRN agitation  LORazepam     Tablet 2 milliGRAM(s) Oral every 6 hours PRN Agitation  LORazepam   Injectable 2 milliGRAM(s) IntraMuscular once PRN agitation  

## 2024-08-09 VITALS — TEMPERATURE: 98 F | OXYGEN SATURATION: 99 %

## 2024-08-15 ENCOUNTER — OUTPATIENT (OUTPATIENT)
Dept: OUTPATIENT SERVICES | Facility: HOSPITAL | Age: 34
LOS: 1 days | Discharge: ANOTHER HOSPITAL | End: 2024-08-15
Payer: COMMERCIAL

## 2024-08-15 PROCEDURE — 90839 PSYTX CRISIS INITIAL 60 MIN: CPT

## 2024-08-19 DIAGNOSIS — F31.73 BIPOLAR DISORDER, IN PARTIAL REMISSION, MOST RECENT EPISODE MANIC: ICD-10-CM

## 2024-08-22 PROCEDURE — 90833 PSYTX W PT W E/M 30 MIN: CPT

## 2024-08-22 PROCEDURE — 99214 OFFICE O/P EST MOD 30 MIN: CPT

## 2024-08-29 ENCOUNTER — APPOINTMENT (OUTPATIENT)
Dept: FAMILY MEDICINE | Facility: CLINIC | Age: 34
End: 2024-08-29

## 2024-08-29 PROBLEM — Z00.00 ENCOUNTER FOR PREVENTIVE HEALTH EXAMINATION: Status: ACTIVE | Noted: 2024-08-29

## 2025-01-20 NOTE — ED BEHAVIORAL HEALTH ASSESSMENT NOTE - LANGUAGE
PCP: Gloria Baig MD    Last appt:  8/28/2024   Future Appointments   Date Time Provider Department Center   2/7/2025  1:00 PM Gloria Baig MD [facility]   3/18/2025  2:00 PM Osmin Sargent Sr., MD [facility]       Requested Prescriptions     Pending Prescriptions Disp Refills    atorvastatin (LIPITOR) 20 MG tablet 90 tablet 2     Sig: Take 1 tablet by mouth daily       Request for a 30 or 90 day supply? Provider Discretion    Pharmacy: confirmed    Other Comments: n/a   No abnormalities noted
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
